# Patient Record
Sex: MALE | Race: BLACK OR AFRICAN AMERICAN | NOT HISPANIC OR LATINO | Employment: FULL TIME | URBAN - METROPOLITAN AREA
[De-identification: names, ages, dates, MRNs, and addresses within clinical notes are randomized per-mention and may not be internally consistent; named-entity substitution may affect disease eponyms.]

---

## 2018-09-11 ENCOUNTER — HOSPITAL ENCOUNTER (INPATIENT)
Facility: HOSPITAL | Age: 48
LOS: 1 days | Discharge: HOME | DRG: 247 | End: 2018-09-12
Attending: EMERGENCY MEDICINE | Admitting: INTERNAL MEDICINE

## 2018-09-11 DIAGNOSIS — I21.4 NSTEMI (NON-ST ELEVATED MYOCARDIAL INFARCTION): Primary | ICD-10-CM

## 2018-09-11 DIAGNOSIS — R07.9 CHEST PAIN: ICD-10-CM

## 2018-09-11 DIAGNOSIS — Z72.0 TOBACCO ABUSE: ICD-10-CM

## 2018-09-11 DIAGNOSIS — R79.89 ELEVATED TROPONIN: ICD-10-CM

## 2018-09-11 DIAGNOSIS — I35.1 AORTIC VALVE INSUFFICIENCY, ETIOLOGY OF CARDIAC VALVE DISEASE UNSPECIFIED: ICD-10-CM

## 2018-09-11 DIAGNOSIS — I10 ESSENTIAL HYPERTENSION: ICD-10-CM

## 2018-09-11 DIAGNOSIS — R07.9 CHEST PAIN, UNSPECIFIED TYPE: ICD-10-CM

## 2018-09-11 LAB
ALBUMIN SERPL BCP-MCNC: 3.9 G/DL
ALP SERPL-CCNC: 98 U/L
ALT SERPL W/O P-5'-P-CCNC: 28 U/L
ANION GAP SERPL CALC-SCNC: 11 MMOL/L
AORTIC VALVE REGURGITATION: ABNORMAL
APTT BLDCRRT: 23 SEC
AST SERPL-CCNC: 20 U/L
BASOPHILS # BLD AUTO: 0.01 K/UL
BASOPHILS NFR BLD: 0.1 %
BASOPHILS NFR BLD: 0.1 %
BASOPHILS NFR BLD: 0.2 %
BILIRUB SERPL-MCNC: 0.3 MG/DL
BNP SERPL-MCNC: 36 PG/ML
BUN SERPL-MCNC: 11 MG/DL
CALCIUM SERPL-MCNC: 9.7 MG/DL
CHLORIDE SERPL-SCNC: 103 MMOL/L
CHOLEST SERPL-MCNC: 194 MG/DL
CHOLEST/HDLC SERPL: 2.6 {RATIO}
CO2 SERPL-SCNC: 24 MMOL/L
CREAT SERPL-MCNC: 0.9 MG/DL
DIASTOLIC DYSFUNCTION: YES
DIFFERENTIAL METHOD: ABNORMAL
DIFFERENTIAL METHOD: NORMAL
DIFFERENTIAL METHOD: NORMAL
EOSINOPHIL # BLD AUTO: 0.1 K/UL
EOSINOPHIL # BLD AUTO: 0.2 K/UL
EOSINOPHIL # BLD AUTO: 0.2 K/UL
EOSINOPHIL NFR BLD: 1.6 %
EOSINOPHIL NFR BLD: 2.8 %
EOSINOPHIL NFR BLD: 3.2 %
ERYTHROCYTE [DISTWIDTH] IN BLOOD BY AUTOMATED COUNT: 13.9 %
ERYTHROCYTE [DISTWIDTH] IN BLOOD BY AUTOMATED COUNT: 14 %
ERYTHROCYTE [DISTWIDTH] IN BLOOD BY AUTOMATED COUNT: 14.1 %
EST. GFR  (AFRICAN AMERICAN): >60 ML/MIN/1.73 M^2
EST. GFR  (NON AFRICAN AMERICAN): >60 ML/MIN/1.73 M^2
ESTIMATED AVG GLUCOSE: 103 MG/DL
GLUCOSE SERPL-MCNC: 98 MG/DL
HBA1C MFR BLD HPLC: 5.2 %
HCT VFR BLD AUTO: 41.1 %
HCT VFR BLD AUTO: 43 %
HCT VFR BLD AUTO: 43.4 %
HDLC SERPL-MCNC: 74 MG/DL
HDLC SERPL: 38.1 %
HGB BLD-MCNC: 14.1 G/DL
HGB BLD-MCNC: 14.7 G/DL
HGB BLD-MCNC: 14.8 G/DL
INR PPP: 0.9
LDLC SERPL CALC-MCNC: 107.4 MG/DL
LYMPHOCYTES # BLD AUTO: 2.6 K/UL
LYMPHOCYTES # BLD AUTO: 3.2 K/UL
LYMPHOCYTES # BLD AUTO: 3.3 K/UL
LYMPHOCYTES NFR BLD: 33.9 %
LYMPHOCYTES NFR BLD: 40.8 %
LYMPHOCYTES NFR BLD: 48.9 %
MCH RBC QN AUTO: 28.6 PG
MCH RBC QN AUTO: 29 PG
MCH RBC QN AUTO: 29.1 PG
MCHC RBC AUTO-ENTMCNC: 33.9 G/DL
MCHC RBC AUTO-ENTMCNC: 34.3 G/DL
MCHC RBC AUTO-ENTMCNC: 34.4 G/DL
MCV RBC AUTO: 84 FL
MCV RBC AUTO: 85 FL
MCV RBC AUTO: 85 FL
MONOCYTES # BLD AUTO: 0.6 K/UL
MONOCYTES # BLD AUTO: 0.7 K/UL
MONOCYTES # BLD AUTO: 0.8 K/UL
MONOCYTES NFR BLD: 9 %
MONOCYTES NFR BLD: 9.4 %
MONOCYTES NFR BLD: 9.5 %
NEUTROPHILS # BLD AUTO: 2.5 K/UL
NEUTROPHILS # BLD AUTO: 3.8 K/UL
NEUTROPHILS # BLD AUTO: 4.2 K/UL
NEUTROPHILS NFR BLD: 38.5 %
NEUTROPHILS NFR BLD: 46.7 %
NEUTROPHILS NFR BLD: 54.8 %
NONHDLC SERPL-MCNC: 120 MG/DL
PLATELET # BLD AUTO: 251 K/UL
PLATELET # BLD AUTO: 256 K/UL
PLATELET # BLD AUTO: 259 K/UL
PMV BLD AUTO: 9.6 FL
PMV BLD AUTO: 9.7 FL
PMV BLD AUTO: 9.9 FL
POTASSIUM SERPL-SCNC: 3.7 MMOL/L
PROT SERPL-MCNC: 6.6 G/DL
PROTHROMBIN TIME: 9.9 SEC
RBC # BLD AUTO: 4.86 M/UL
RBC # BLD AUTO: 5.09 M/UL
RBC # BLD AUTO: 5.14 M/UL
RETIRED EF AND QEF - SEE NOTES: 65 (ref 55–65)
SODIUM SERPL-SCNC: 138 MMOL/L
TRIGL SERPL-MCNC: 63 MG/DL
TROPONIN I SERPL DL<=0.01 NG/ML-MCNC: 0.05 NG/ML
TROPONIN I SERPL DL<=0.01 NG/ML-MCNC: 0.85 NG/ML
WBC # BLD AUTO: 6.56 K/UL
WBC # BLD AUTO: 7.72 K/UL
WBC # BLD AUTO: 8.12 K/UL

## 2018-09-11 PROCEDURE — 93010 ELECTROCARDIOGRAM REPORT: CPT | Mod: ,,, | Performed by: INTERNAL MEDICINE

## 2018-09-11 PROCEDURE — 83036 HEMOGLOBIN GLYCOSYLATED A1C: CPT

## 2018-09-11 PROCEDURE — 25000003 PHARM REV CODE 250: Performed by: STUDENT IN AN ORGANIZED HEALTH CARE EDUCATION/TRAINING PROGRAM

## 2018-09-11 PROCEDURE — C9600 PERC DRUG-EL COR STENT SING: HCPCS | Mod: RI

## 2018-09-11 PROCEDURE — 93005 ELECTROCARDIOGRAM TRACING: CPT

## 2018-09-11 PROCEDURE — 21400001 HC TELEMETRY ROOM

## 2018-09-11 PROCEDURE — 83880 ASSAY OF NATRIURETIC PEPTIDE: CPT

## 2018-09-11 PROCEDURE — 99285 EMERGENCY DEPT VISIT HI MDM: CPT

## 2018-09-11 PROCEDURE — B211YZZ FLUOROSCOPY OF MULTIPLE CORONARY ARTERIES USING OTHER CONTRAST: ICD-10-PCS | Performed by: INTERNAL MEDICINE

## 2018-09-11 PROCEDURE — 63600175 PHARM REV CODE 636 W HCPCS: Performed by: INTERNAL MEDICINE

## 2018-09-11 PROCEDURE — 25500020 PHARM REV CODE 255

## 2018-09-11 PROCEDURE — 85025 COMPLETE CBC W/AUTO DIFF WBC: CPT

## 2018-09-11 PROCEDURE — 85610 PROTHROMBIN TIME: CPT

## 2018-09-11 PROCEDURE — 0270346 DILATION OF CORONARY ARTERY, ONE ARTERY, BIFURCATION, WITH DRUG-ELUTING INTRALUMINAL DEVICE, PERCUTANEOUS APPROACH: ICD-10-PCS | Performed by: INTERNAL MEDICINE

## 2018-09-11 PROCEDURE — 93306 TTE W/DOPPLER COMPLETE: CPT

## 2018-09-11 PROCEDURE — 84484 ASSAY OF TROPONIN QUANT: CPT | Mod: 91

## 2018-09-11 PROCEDURE — 4A033BC MEASUREMENT OF ARTERIAL PRESSURE, CORONARY, PERCUTANEOUS APPROACH: ICD-10-PCS | Performed by: INTERNAL MEDICINE

## 2018-09-11 PROCEDURE — A4216 STERILE WATER/SALINE, 10 ML: HCPCS | Performed by: STUDENT IN AN ORGANIZED HEALTH CARE EDUCATION/TRAINING PROGRAM

## 2018-09-11 PROCEDURE — 80053 COMPREHEN METABOLIC PANEL: CPT

## 2018-09-11 PROCEDURE — 85730 THROMBOPLASTIN TIME PARTIAL: CPT

## 2018-09-11 PROCEDURE — 25000003 PHARM REV CODE 250: Performed by: INTERNAL MEDICINE

## 2018-09-11 PROCEDURE — 84484 ASSAY OF TROPONIN QUANT: CPT

## 2018-09-11 PROCEDURE — 80061 LIPID PANEL: CPT

## 2018-09-11 PROCEDURE — 63600175 PHARM REV CODE 636 W HCPCS: Performed by: STUDENT IN AN ORGANIZED HEALTH CARE EDUCATION/TRAINING PROGRAM

## 2018-09-11 PROCEDURE — 93010 ELECTROCARDIOGRAM REPORT: CPT | Mod: 76,59,, | Performed by: INTERNAL MEDICINE

## 2018-09-11 PROCEDURE — 36415 COLL VENOUS BLD VENIPUNCTURE: CPT

## 2018-09-11 PROCEDURE — 96374 THER/PROPH/DIAG INJ IV PUSH: CPT

## 2018-09-11 PROCEDURE — 63600175 PHARM REV CODE 636 W HCPCS

## 2018-09-11 PROCEDURE — 93010 ELECTROCARDIOGRAM REPORT: CPT | Mod: 59,,, | Performed by: INTERNAL MEDICINE

## 2018-09-11 PROCEDURE — 25000003 PHARM REV CODE 250

## 2018-09-11 PROCEDURE — 93571 IV DOP VEL&/PRESS C FLO 1ST: CPT | Mod: 74

## 2018-09-11 PROCEDURE — 25000003 PHARM REV CODE 250: Performed by: EMERGENCY MEDICINE

## 2018-09-11 RX ORDER — LISINOPRIL 20 MG/1
20 TABLET ORAL DAILY
Status: DISCONTINUED | OUTPATIENT
Start: 2018-09-11 | End: 2018-09-12

## 2018-09-11 RX ORDER — ATORVASTATIN CALCIUM 40 MG/1
80 TABLET, FILM COATED ORAL NIGHTLY
Status: DISCONTINUED | OUTPATIENT
Start: 2018-09-11 | End: 2018-09-12 | Stop reason: HOSPADM

## 2018-09-11 RX ORDER — HEPARIN SODIUM,PORCINE/D5W 25000/250
12 INTRAVENOUS SOLUTION INTRAVENOUS CONTINUOUS
Status: DISCONTINUED | OUTPATIENT
Start: 2018-09-11 | End: 2018-09-11

## 2018-09-11 RX ORDER — SODIUM CHLORIDE 9 MG/ML
INJECTION, SOLUTION INTRAVENOUS CONTINUOUS
Status: ACTIVE | OUTPATIENT
Start: 2018-09-11 | End: 2018-09-12

## 2018-09-11 RX ORDER — ACETAMINOPHEN 325 MG/1
650 TABLET ORAL ONCE
Status: COMPLETED | OUTPATIENT
Start: 2018-09-11 | End: 2018-09-11

## 2018-09-11 RX ORDER — LISINOPRIL 10 MG/1
10 TABLET ORAL DAILY
Status: DISCONTINUED | OUTPATIENT
Start: 2018-09-11 | End: 2018-09-11

## 2018-09-11 RX ORDER — SODIUM CHLORIDE 0.9 % (FLUSH) 0.9 %
3 SYRINGE (ML) INJECTION EVERY 8 HOURS
Status: DISCONTINUED | OUTPATIENT
Start: 2018-09-11 | End: 2018-09-11

## 2018-09-11 RX ORDER — METOPROLOL TARTRATE 50 MG/1
50 TABLET ORAL
Status: COMPLETED | OUTPATIENT
Start: 2018-09-11 | End: 2018-09-11

## 2018-09-11 RX ORDER — ASPIRIN 81 MG/1
81 TABLET ORAL DAILY
Status: DISCONTINUED | OUTPATIENT
Start: 2018-09-11 | End: 2018-09-12 | Stop reason: HOSPADM

## 2018-09-11 RX ORDER — AMLODIPINE BESYLATE 5 MG/1
10 TABLET ORAL DAILY
Status: DISCONTINUED | OUTPATIENT
Start: 2018-09-11 | End: 2018-09-12 | Stop reason: HOSPADM

## 2018-09-11 RX ORDER — METOPROLOL TARTRATE 25 MG/1
25 TABLET, FILM COATED ORAL 2 TIMES DAILY
Status: DISCONTINUED | OUTPATIENT
Start: 2018-09-11 | End: 2018-09-12

## 2018-09-11 RX ORDER — ENOXAPARIN SODIUM 100 MG/ML
40 INJECTION SUBCUTANEOUS EVERY 24 HOURS
Status: DISCONTINUED | OUTPATIENT
Start: 2018-09-11 | End: 2018-09-12

## 2018-09-11 RX ADMIN — ASPIRIN 81 MG: 81 TABLET, COATED ORAL at 01:09

## 2018-09-11 RX ADMIN — Medication 3 ML: at 08:09

## 2018-09-11 RX ADMIN — ACETAMINOPHEN 650 MG: 325 TABLET ORAL at 08:09

## 2018-09-11 RX ADMIN — LISINOPRIL 20 MG: 10 TABLET ORAL at 08:09

## 2018-09-11 RX ADMIN — ENOXAPARIN SODIUM 40 MG: 100 INJECTION SUBCUTANEOUS at 06:09

## 2018-09-11 RX ADMIN — METOPROLOL TARTRATE 25 MG: 25 TABLET ORAL at 10:09

## 2018-09-11 RX ADMIN — SODIUM CHLORIDE: 0.9 INJECTION, SOLUTION INTRAVENOUS at 01:09

## 2018-09-11 RX ADMIN — AMLODIPINE BESYLATE 10 MG: 5 TABLET ORAL at 03:09

## 2018-09-11 RX ADMIN — TICAGRELOR 90 MG: 90 TABLET ORAL at 10:09

## 2018-09-11 RX ADMIN — NITROGLYCERIN 1 INCH: 20 OINTMENT TOPICAL at 01:09

## 2018-09-11 RX ADMIN — ATORVASTATIN CALCIUM 80 MG: 40 TABLET, FILM COATED ORAL at 10:09

## 2018-09-11 RX ADMIN — METOPROLOL TARTRATE 50 MG: 50 TABLET ORAL at 01:09

## 2018-09-11 RX ADMIN — BIVALIRUDIN 1.75 MG/KG/HR: 250 INJECTION, POWDER, LYOPHILIZED, FOR SOLUTION INTRAVENOUS at 01:09

## 2018-09-11 RX ADMIN — TICAGRELOR 180 MG: 90 TABLET ORAL at 01:09

## 2018-09-11 NOTE — BRIEF OP NOTE
Left heart cath via right radial: Dr.sub  Blood loss: < 50 cc  Complications: none  Left main: normal  LAD: normal  Cx: normal  Right: normal  Ramus: large with at least an 80% lesion in its mid portion; iFR .79. Thus, primary stent done without complications.    ASA, Brilinta, statin, blood pressure control

## 2018-09-11 NOTE — NURSING
Patient transferred to floor via stretcher with telemetry monitor in place and all personal belongings. Patient has no complaints of pain and NAD noted. Upon arrival to room patient placed on portable telemetry box and assisted to bed. PAT Shahid made aware of patient's arrival. Floor nurse and cath lab nurse assess site. Right Radial site clean dry and intact with gauze and Tegaderm in place. No swelling, bleeding or hematoma at site with +2 palpable radial pulses.  All questions answered. Family and nurse at bedside.

## 2018-09-11 NOTE — NURSING
RIGHT RADIAL Vasc band removed. Gauze with Tegaderm applied. Site clean, dry, and intact with bilateral +2 palpable radial pulses. No hematoma or bleeding noted. Patient has no complaints of pain at this time. Vital signs stable. Will continue to monitor.

## 2018-09-11 NOTE — NURSING
Patient arrived to room via stretcher with ER staff. Patient alert and oriented. Follows commands without issues. Tele monitor applied as per orders. ivf infusing via iv site to RAC via pump without difficulties. Verbalizes answers without hesitation. High fall risk protocol established. Bed alarm activated. For further information refer to admission assessment data sheets. Will continue to monitor patient and intervene prn.

## 2018-09-11 NOTE — CARE UPDATE
AM troponin further elevated to 0.852 from 0.046 without EKG changes > NSTEMI. Discontinued scheduled stress test, ordered heparin drip and placed cardiology consult. Holding Plavix for now until seen from cards and get their recs.    Aline Guzman MD  [facility] Internal Medicine HO-I

## 2018-09-11 NOTE — CONSULTS
[facility] Cardiology Consult Note       Date of Admit: 9/11/2018    Chief Complaint     Chest Pain for several hours     Subjective:      History of Present Illness:  Celso Dallas is a 48 y.o.  male with a PMH of HTN who presented on the evening of 9/10/11 with multiple hours of chest pain. The patient reports that he was relaxing at home yesterday evening when he began to experience sharp, substernal, non-radiating chest pain. He reports that he took an aspirin in attempt to relieve the pain but the pain did not improve. He also experienced associated diaphoresis and vomiting. He denies lightheadedness, headache, LOC, SOB. He reports that he has experienced similar but less severe chest pain last week, but that episode resolved on its own.  Patient called EMS and was brought to the ED at [facility]. Patient received ASA and sublingual nitro which led to improvement of his pain. Troponin has trended upward .046 --> .852. EKG has remained without signs of infarction.   Patient was recently started on HTN medication, but ran out a few weeks ago and thus has not been taking it. He denies any other medical history. Reports smoking ~2 packs of cigarettes since he was 17 and drinking a 6 pack of beer daily ( wife reports this is a low estimate and feels that he drinks up to 24 beers daily). He works as a   and reports he has not had any chest pain while at work. Cardiology is consulted for evaluation of ACS.     Past Medical History:  No past medical history on file.    Past Surgical History:  No past surgical history on file.    Allergies:  Review of patient's allergies indicates:  No Known Allergies    Home Medications:  Prior to Admission medications    Not on File       Family History:  No family history on file.    Social History:  Social History     Tobacco Use    Smoking status: Current Every Day Smoker     Packs/day: 1.00     Types: Cigarettes    Smokeless tobacco: Never Used   Substance Use Topics     "Alcohol use: Yes    Drug use: No       Review of Systems:  Pertinent items are noted in HPI. All other systems are reviewed and are negative.     Objective:   Last 24 Hour Vital Signs:  BP  Min: 130/94  Max: 173/97  Temp  Av.2 °F (36.8 °C)  Min: 98.1 °F (36.7 °C)  Max: 98.2 °F (36.8 °C)  Pulse  Av.2  Min: 68  Max: 109  Resp  Av  Min: 16  Max: 21  SpO2  Av.5 %  Min: 96 %  Max: 98 %  Height  Av' 4" (162.6 cm)  Min: 5' 4" (162.6 cm)  Max: 5' 4" (162.6 cm)  Weight  Av.7 kg (136 lb)  Min: 61.7 kg (135 lb 16 oz)  Max: 61.7 kg (136 lb)  Body mass index is 23.34 kg/m².  No intake/output data recorded.    Physical Examination:  /83   Pulse 80   Temp 98.1 °F (36.7 °C) (Oral)   Resp (!) 21   Ht 5' 4" (1.626 m)   Wt 61.7 kg (135 lb 16 oz)   SpO2 98%   BMI 23.34 kg/m²     General Appearance:    Alert, cooperative, no distress, appears stated age   Head:    Normocephalic, without obvious abnormality, atraumatic   Eyes:    PERRL, muddy sclera, EOMI, both eyes        Throat:   Lips, mucosa, and tongue normal; teeth and gums normal   Neck:   Supple, symmetrical, trachea midline, no adenopathy;        thyroid:  No enlargement/tenderness/nodules; no carotid    bruit or JVD   Lungs:     Clear to auscultation bilaterally, respirations unlabored   Heart:    Regular rate and rhythm, S1 and S2 normal, no murmur, rub   or gallop   Abdomen:     Soft, non-tender, bowel sounds active all four quadrants,     no masses, no organomegaly   Extremities:   Extremities normal, atraumatic, no cyanosis or edema   Pulses:   2+ and symmetric all extremities   Skin:   Skin color, texture, turgor normal, no rashes or lesions   Neurologic:   Grossly Normal          Laboratory:  Most Recent Data:  CBC:   Lab Results   Component Value Date    WBC 7.72 2018    HGB 14.1 2018    HCT 41.1 2018     2018    MCV 85 2018    RDW 14.1 2018       BMP:   Lab Results   Component Value " Date     09/11/2018    K 3.7 09/11/2018     09/11/2018    CO2 24 09/11/2018    BUN 11 09/11/2018    CREATININE 0.9 09/11/2018    GLU 98 09/11/2018    CALCIUM 9.7 09/11/2018     LFTs:   Lab Results   Component Value Date    PROT 6.6 09/11/2018    ALBUMIN 3.9 09/11/2018    BILITOT 0.3 09/11/2018    AST 20 09/11/2018    ALKPHOS 98 09/11/2018    ALT 28 09/11/2018     Coags:   Lab Results   Component Value Date    INR 0.9 09/11/2018     FLP:   Lab Results   Component Value Date    CHOL 194 09/11/2018    HDL 74 09/11/2018    LDLCALC 107.4 09/11/2018    TRIG 63 09/11/2018    CHOLHDL 38.1 09/11/2018     DM:   Lab Results   Component Value Date    HGBA1C 5.2 09/11/2018    LDLCALC 107.4 09/11/2018    CREATININE 0.9 09/11/2018     Thyroid: No results found for: TSH, FREET4, G3DBOZS, X8PAHTH, THYROIDAB  Anemia: No results found for: IRON, TIBC, FERRITIN, JMUHGDCJ04, FOLATE  Cardiac:   Lab Results   Component Value Date    TROPONINI 0.852 (H) 09/11/2018    BNP 36 09/11/2018     Urinalysis: No results found for: LABURIN, COLORU, CLARITYU, SPECGRAV, LABSPEC, NITRITE, PROTEINUR, GLUCOSEU, KETONESU, UROBILINOGEN, BILIRUBINUR, BLOODU, RBCU, WBCUA    Trended Lab Data:  Recent Labs   Lab  09/11/18   0100 09/11/18   0654   WBC  6.56  7.72   HGB  14.8  14.1   HCT  43.0  41.1   PLT  259  251   MCV  85  85   RDW  13.9  14.1   NA  138   --    K  3.7   --    CL  103   --    CO2  24   --    BUN  11   --    CREATININE  0.9   --    GLU  98   --    PROT  6.6   --    ALBUMIN  3.9   --    BILITOT  0.3   --    AST  20   --    ALKPHOS  98   --    ALT  28   --        Trended Cardiac Data:  Recent Labs   Lab  09/11/18   0100 09/11/18   0506   TROPONINI  0.046*  0.852*   BNP  36   --        Other Results:  EKG (my interpretation): Normal sinus rhythm, no evidence of infarction or ischemia      Radiology:  Imaging Results          X-Ray Chest AP Portable (Final result)  Result time 09/11/18 01:38:53    Final result by Santi Castro MD  "(09/11/18 01:38:53)                 Impression:      Coarsened interstitial lung markings, possibly mild interstitial edema.  Otherwise, no significant abnormality.      Electronically signed by: Santi Castro MD  Date:    09/11/2018  Time:    01:38             Narrative:    EXAMINATION:  XR CHEST AP PORTABLE    CLINICAL HISTORY:  Provided history is "  Chest pain, unspecified".    TECHNIQUE:  One view of the chest.    COMPARISON:  None.    FINDINGS:  Cardiac wires overlie the chest.  Cardiac silhouette is at the upper limits of normal.  There are coarsened interstitial lung markings, but no large focal consolidation.  No sizable pleural effusion.  No pneumothorax.                                 Assessment:     Celso Dallas is a 48 y.o. male with PMH of HTN who presented with chest pain.        Plan:     NSTEMI  - patient presented with persistent non radiating substernal chest pain   - troponin has trended upward .046 --> .852.   - EKG remains without signs of ischemia or infarction   - started on ASA, heparin drip, BB by primary team   - will take to cath lab for coronary angiography     Essential HTN  - recently diagnosed with HTN. Had not seen a doctor for some time prior   - has not been taking his medication for a few weeks as he ran out   - restart home lisinopril         Alfonso Alatorre MD  [facility] Cardiology Service HO-1            "

## 2018-09-11 NOTE — NURSING
2cc of air removed from RIGHT RADIAL Vasc band. No hematoma or bleeding noted.Vital signs stable with +2 palpable pulses. Will continue to monitor.

## 2018-09-11 NOTE — NURSING
Patient arrived to recovery cath lab.  Pt drowsy but able to follow commands.  VSS.  R radial site is CDI, site soft, no bleeding or hematoma noted.  Fall risk precautions given and patients acknowledges.  Will continue to monitor

## 2018-09-11 NOTE — LETTER
September 12, 2018         [address] [place] [address] [place] [address]  Phone: [phone]  Fax: [phone]       Patient: Celso Dallas   YOB: 1970  Date of Visit: 09/12/2018    To Whom It May Concern:    Cyndi Dallas  was at [facility] on 09/12/2018. His family member:      was at the hospital visiting him in his time of need. Please excuse from work today 9/12. If you have any questions or concerns, or if I can be of further assistance, please do not hesitate to contact me.    Sincerely,    Brian Figueroa, DO

## 2018-09-11 NOTE — H&P
[facility] Medicine H&P Note     Admitting Team: [facility] Hospitalist Team A  Attending Physician: Dr. Maryan Castro  Resident: Dr. Uziel Don  Intern: Dr. Brian Figueroa    Date of Admit: 9/11/2018    Chief Complaint     Intermittent chest pain x 1 week    Subjective:      History of Present Illness:  Celso Dallas is a 48 y.o. AA male who  has no past medical history on file.. The patient presented to [facility] on 9/11/2018 with a primary complaint of Chest Pain (Patient presents to the ED via  EMS from home with reports of having chest pain that started tonight. Treated with x 1 sublingual nitro per EMS. Pain started at a 6 and came down to a 3. )      The patient was in their usual state of health until about a week prior to presentation when he had an episode of chest pain while laying down, which he reports went away on its own. He had an additional milder episode later in the week which also went away on its own. Then on evening of 9/10, pt had onset of an episode of chest pain at rest described as sharp 8/10 pain in mid-chest, non-radiating. The pain continued and he had an episode of vomiting as well as diaphoresis, so he decided to call EMS. EMS reportedly gave him ASA and nitro which finally relieved his pain; BP after administration of the ASA and nitro was in 130s systolic. Pt denies SOB, syncope, headache, vision changes upon arrival to ED. Pt denies any recent F/C, N/V, abd pain, changes in BMs, or urinary sx.    Pt does report that he was recently diagnosed with HTN and had been started on a medication, possibly lisinopril, but that he has been out of it for about 3 weeks now.    Past Medical History:  HTN    Past Surgical History:  Appendectomy    Allergies:  No Known Allergies    Home Medications:  Prior to Admission medications    Not on File       Family History:  HTN, DM-mother    Social History:  Social History     Tobacco Use    Smoking status: Current Every Day Smoker      "Packs/day: 1.00ppd x 30 years     Types: Cigarettes    Smokeless tobacco: Never Used   Substance Use Topics    Alcohol use: Yes, 6pack/day, up to 12 pack on wkends    Drug use: No   Lives with girlfriend since moving to [place] recently for work from [place], [place]. Works as a .    Review of Systems:  Pertinent items are noted in HPI. All other systems are reviewed and are negative.    Health Maintaince :   Primary Care Physician: no PCP (pt just moved here from [place], [place])    Immunizations:   TDap UTD  Flu not UTD  Pna not UTD    Cancer Screening:  Colonoscopy: not yet had one     Objective:   Last 24 Hour Vital Signs:  BP  Min: 159/99  Max: 173/97  Pulse  Av.5  Min: 83  Max: 86  Resp  Av  Min: 16  Max: 16  SpO2  Av %  Min: 98 %  Max: 98 %  Height  Av' 4" (162.6 cm)  Min: 5' 4" (162.6 cm)  Max: 5' 4" (162.6 cm)  Weight  Av.7 kg (136 lb)  Min: 61.7 kg (136 lb)  Max: 61.7 kg (136 lb)  Body mass index is 23.34 kg/m².  No intake/output data recorded.    Physical Examination:  GEN-AOx3, NAD, laying comfortably in bed  HEENT-PERRL, EOMI, MMM, throat without erythema or exudates  NECK-no thyromegaly or other masses; acanthosis nigricans present  HEART-RRR, no murmurs or rubs  RESP- normal work of breathing, mild crackles/rhonchi in posterior 2/3 of lungs, upper and anterior lung fields clear  ABD-soft, NT, ND, +BS; no masses or HSM  EXT-no clubbing, cyanosis, or edema; 2+ DP/PT pulses  NEURO-CN II-XII grossly intact; moves all four extremities equally; light touch sensation intact and symmetric in all four extremities  SKIN-warm and dry; no rashes       Laboratory:  Most Recent Data:  CBC:   Lab Results   Component Value Date    WBC 6.56 2018    HGB 14.8 2018    HCT 43.0 2018     2018    MCV 85 2018    RDW 13.9 2018     WBC Differential: 38.5 % N, 0 % Bands, 48.9 % L, 9 % M, 3.2 % Eo, 0.2 % Baso, 0 additional cells seen  BMP:   Lab " Results   Component Value Date     09/11/2018    K 3.7 09/11/2018     09/11/2018    CO2 24 09/11/2018    BUN 11 09/11/2018    CREATININE 0.9 09/11/2018    GLU 98 09/11/2018    CALCIUM 9.7 09/11/2018     LFTs:   Lab Results   Component Value Date    PROT 6.6 09/11/2018    ALBUMIN 3.9 09/11/2018    BILITOT 0.3 09/11/2018    AST 20 09/11/2018    ALKPHOS 98 09/11/2018    ALT 28 09/11/2018     Coags: No results found for: INR, PROTIME, PTT  FLP: No results found for: CHOL, HDL, LDLCALC, TRIG, CHOLHDL  DM:   Lab Results   Component Value Date    CREATININE 0.9 09/11/2018     Thyroid: No results found for: TSH, FREET4, Z0YXIOK, S5XGQQW, THYROIDAB  Anemia: No results found for: IRON, TIBC, FERRITIN, [id number], FOLATE  Cardiac:   Lab Results   Component Value Date    TROPONINI 0.046 (H) 09/11/2018    BNP 36 09/11/2018     Urinalysis: No results found for: LABURIN, COLORU, CLARITYU, SPECGRAV, LABSPEC, NITRITE, PROTEINUR, GLUCOSEU, KETONESU, UROBILINOGEN, BILIRUBINUR, BLOODU, RBCU, WBCUA    Trended Lab Data:  Recent Labs   Lab  09/11/18   0100   WBC  6.56   HGB  14.8   HCT  43.0   PLT  259   MCV  85   RDW  13.9   NA  138   K  3.7   CL  103   CO2  24   BUN  11   CREATININE  0.9   GLU  98   PROT  6.6   ALBUMIN  3.9   BILITOT  0.3   AST  20   ALKPHOS  98   ALT  28       Trended Cardiac Data:  Recent Labs   Lab  09/11/18   0100   TROPONINI  0.046*   BNP  36       Microbiology Data:  None    Other Results:  EKG : 9/11 LVH, left axis deviation; no ST changes    Radiology:  Imaging Results          X-Ray Chest AP Portable (Final result)  Result time 09/11/18 01:38:53    Final result by Santi Castro MD (09/11/18 01:38:53)                 Impression:      Coarsened interstitial lung markings, possibly mild interstitial edema.  Otherwise, no significant abnormality.      Electronically signed by: Santi Castro MD  Date:    09/11/2018  Time:    01:38             Narrative:    EXAMINATION:  XR CHEST AP  "PORTABLE    CLINICAL HISTORY:  Provided history is "  Chest pain, unspecified".    TECHNIQUE:  One view of the chest.    COMPARISON:  None.    FINDINGS:  Cardiac wires overlie the chest.  Cardiac silhouette is at the upper limits of normal.  There are coarsened interstitial lung markings, but no large focal consolidation.  No sizable pleural effusion.  No pneumothorax.                                 Assessment:     Celso Dallas is a 48 y.o. male with:  Patient Active Problem List    Diagnosis Date Noted    Chest pain 09/11/2018    Essential hypertension 09/11/2018        Plan:     Chest pain  -pt with possible atypical angina (intermittent chest pain at rest x3 in past week, usually when seated/laying down)  -pain on 9/11 relieved with ASA and nitroglycerin given by EMS  -EKG showed LVH, left axis deviation; no ST changes > trend  -Trop 0.046 > trend  -ordered ECHO and stress test     Essential HTN  -pt recently diagnosed with HTN and on lisinopril at home, unknown dose, but ran out 3 weeks ago  -will start with lisinopril 10mg while inpatient    HCM  -check A1c (pt with acanthosis nigricans on exam)  -check lipid panel  -give pneumovax prior to discharge      Code Status:     Full    Aline Guzman MD  [facility] Internal Medicine HO-I    [facility] Medicine Hospitalist Pager numbers:   [facility] Hospitalist Medicine Team A (Gloria/Haylie): [phone]  [facility] Hospitalist Medicine Team B (Jorge/Vaishali):  [phone]        "

## 2018-09-11 NOTE — ED PROVIDER NOTES
Encounter Date: 9/11/2018    SCRIBE #1 NOTE: I, Jaime Weathers, am scribing for, and in the presence of,  Dr. Matthews. I have scribed the entire note.       History     Chief Complaint   Patient presents with    Chest Pain     Patient presents to the ED via  EMS from home with reports of having chest pain that started tonight. Treated with x 1 sublingual nitro per EMS. Pain started at a 6 and came down to a 3.      This is a 48 y.o. male who has no past medical history on file.     The patient presents to the Emergency Department with chest pain.   He reports onset of symptoms was about 1 week ago  The patient reports the pain has been intermittent since onset.  Located in the center of the chest and described as pressure.  The patient states he was resting when the pain began  Symptoms are associated with palpitations, nausea and vomiting.  Pt denies shortness of breath.   There was no change in symptoms with movement or rest.   The patient admits to taking an aspirin.  Per EMS the patient was given sublingual Nitro and 3-81 mg aspirin with improvement of pain  Patient has no prior history of similar symptoms.   Of note the patient was recently diagnosed with hypertension a few weeks ago and non-compliant.  Pt has no past surgical history on file.        The history is provided by the patient and the EMS personnel.     Review of patient's allergies indicates:  No Known Allergies  No past medical history on file.  No past surgical history on file.  No family history on file.  Social History     Tobacco Use    Smoking status: Current Every Day Smoker     Packs/day: 1.00     Types: Cigarettes    Smokeless tobacco: Never Used   Substance Use Topics    Alcohol use: Yes    Drug use: No     Review of Systems   Constitutional: Positive for diaphoresis. Negative for chills and fever.   Eyes: Negative for visual disturbance.   Respiratory: Negative for shortness of breath.    Cardiovascular: Positive for chest pain  and palpitations.   Gastrointestinal: Positive for nausea and vomiting.   Musculoskeletal: Negative for back pain.   Neurological: Positive for dizziness and weakness. Negative for headaches.   All other systems reviewed and are negative.      Physical Exam     Initial Vitals   BP Pulse Resp Temp SpO2   09/11/18 0050 09/11/18 0050 09/11/18 0050 09/11/18 0312 09/11/18 0050   (!) 173/97 83 16 98.2 °F (36.8 °C) 98 %      MAP       --                Physical Exam    Nursing note and vitals reviewed.  Constitutional: He appears well-developed and well-nourished. He is not diaphoretic. No distress.   HENT:   Head: Normocephalic and atraumatic.   Mouth/Throat: Oropharynx is clear and moist.   Eyes: Conjunctivae and EOM are normal.   Neck: Normal range of motion. Neck supple.   Cardiovascular: Normal rate, regular rhythm and normal heart sounds. Exam reveals no gallop and no friction rub.    No murmur heard.  Pulmonary/Chest: Breath sounds normal. He has no wheezes. He has no rhonchi. He has no rales.   Abdominal: Soft. There is no tenderness. There is no rebound and no guarding.   Musculoskeletal: Normal range of motion. He exhibits no edema or tenderness.   Lymphadenopathy:     He has no cervical adenopathy.   Neurological: He is alert and oriented to person, place, and time. He has normal strength.   Skin: Skin is warm and dry. No rash noted.         ED Course   Procedures  Labs Reviewed   CBC W/ AUTO DIFFERENTIAL - Abnormal; Notable for the following components:       Result Value    Lymph% 48.9 (*)     All other components within normal limits   TROPONIN I - Abnormal; Notable for the following components:    Troponin I 0.046 (*)     All other components within normal limits   COMPREHENSIVE METABOLIC PANEL   B-TYPE NATRIURETIC PEPTIDE   LIPID PANEL   HEMOGLOBIN A1C   TROPONIN I     EKG Readings: (Independently Interpreted)   Initial Reading: No STEMI. Conduction: Normal.   Normal sinus rhythm with a rate of 74. LAD.  "LVH. No ST or T wave changes.       Imaging Results          X-Ray Chest AP Portable (Final result)  Result time 09/11/18 01:38:53    Final result by Santi Castro MD (09/11/18 01:38:53)                 Impression:      Coarsened interstitial lung markings, possibly mild interstitial edema.  Otherwise, no significant abnormality.      Electronically signed by: Santi Castro MD  Date:    09/11/2018  Time:    01:38             Narrative:    EXAMINATION:  XR CHEST AP PORTABLE    CLINICAL HISTORY:  Provided history is "  Chest pain, unspecified".    TECHNIQUE:  One view of the chest.    COMPARISON:  None.    FINDINGS:  Cardiac wires overlie the chest.  Cardiac silhouette is at the upper limits of normal.  There are coarsened interstitial lung markings, but no large focal consolidation.  No sizable pleural effusion.  No pneumothorax.                                 Medical Decision Making:   Initial Assessment:   This is an emergent evaluation of a 48 y.o.male patient with presentation of chest pain with associated symptoms.   Initial differentials include but are not limited to: ACS, GERD, Aortic dissection, thoracic aneurysm.   Plan: cardiac work up including troponin, EKG and chest xray, Beta blocker, Nitro paste, likely admit    Clinical Tests:   Lab Tests: Ordered and Reviewed  Radiological Study: Ordered and Reviewed  Medical Tests: Ordered and Reviewed  ED Management:  1:45 AM Patient has an elevated troponin of 0.046, will admit the patient to the hospital for further evaluation    1:47 AM [facility]    1:47 AM Case discussed with [facility], will come to evaluate and admit the patient                      Clinical Impression:     1. Chest pain         Scribe attestation: I, Dr. Venancio Matthews, personally performed the services described in this documentation.   All medical record entries made by the scribe were at my direction and in my presence.   I have reviewed the chart and " agree that the record is accurate and complete.   Venancio Matthews MD.                   Venancio Matthews MD  09/11/18 0547

## 2018-09-12 ENCOUNTER — CLINICAL SUPPORT (OUTPATIENT)
Dept: SMOKING CESSATION | Facility: CLINIC | Age: 48
End: 2018-09-12
Payer: COMMERCIAL

## 2018-09-12 ENCOUNTER — DOCUMENTATION ONLY (OUTPATIENT)
Dept: PHARMACY | Facility: CLINIC | Age: 48
End: 2018-09-12

## 2018-09-12 VITALS
BODY MASS INDEX: 24.81 KG/M2 | TEMPERATURE: 99 F | SYSTOLIC BLOOD PRESSURE: 141 MMHG | HEIGHT: 64 IN | HEART RATE: 65 BPM | WEIGHT: 145.31 LBS | RESPIRATION RATE: 18 BRPM | DIASTOLIC BLOOD PRESSURE: 98 MMHG | OXYGEN SATURATION: 100 %

## 2018-09-12 DIAGNOSIS — Z72.0 TOBACCO ABUSE: Primary | ICD-10-CM

## 2018-09-12 DIAGNOSIS — F17.210 CIGARETTE SMOKER: ICD-10-CM

## 2018-09-12 DIAGNOSIS — Z72.0 TOBACCO USE: ICD-10-CM

## 2018-09-12 PROBLEM — I25.10 CORONARY ARTERY DISEASE INVOLVING NATIVE CORONARY ARTERY: Status: ACTIVE | Noted: 2018-09-12

## 2018-09-12 PROBLEM — I35.1 AORTIC INSUFFICIENCY: Status: ACTIVE | Noted: 2018-09-12

## 2018-09-12 PROBLEM — I21.4 NSTEMI (NON-ST ELEVATED MYOCARDIAL INFARCTION): Status: RESOLVED | Noted: 2018-09-11 | Resolved: 2018-09-12

## 2018-09-12 LAB
ANION GAP SERPL CALC-SCNC: 9 MMOL/L
BUN SERPL-MCNC: 8 MG/DL
CALCIUM SERPL-MCNC: 9.9 MG/DL
CHLORIDE SERPL-SCNC: 106 MMOL/L
CO2 SERPL-SCNC: 21 MMOL/L
CORONARY STENOSIS: ABNORMAL
CORONARY STENT: YES
CREAT SERPL-MCNC: 0.8 MG/DL
EST. GFR  (AFRICAN AMERICAN): >60 ML/MIN/1.73 M^2
EST. GFR  (NON AFRICAN AMERICAN): >60 ML/MIN/1.73 M^2
GLUCOSE SERPL-MCNC: 95 MG/DL
POTASSIUM SERPL-SCNC: 4.1 MMOL/L
SODIUM SERPL-SCNC: 136 MMOL/L

## 2018-09-12 PROCEDURE — S4991 NICOTINE PATCH NONLEGEND: HCPCS | Performed by: STUDENT IN AN ORGANIZED HEALTH CARE EDUCATION/TRAINING PROGRAM

## 2018-09-12 PROCEDURE — 36415 COLL VENOUS BLD VENIPUNCTURE: CPT

## 2018-09-12 PROCEDURE — 25000003 PHARM REV CODE 250: Performed by: INTERNAL MEDICINE

## 2018-09-12 PROCEDURE — 99407 BEHAV CHNG SMOKING > 10 MIN: CPT | Mod: S$GLB,,,

## 2018-09-12 PROCEDURE — 3E0234Z INTRODUCTION OF SERUM, TOXOID AND VACCINE INTO MUSCLE, PERCUTANEOUS APPROACH: ICD-10-PCS | Performed by: INTERNAL MEDICINE

## 2018-09-12 PROCEDURE — 25000003 PHARM REV CODE 250: Performed by: STUDENT IN AN ORGANIZED HEALTH CARE EDUCATION/TRAINING PROGRAM

## 2018-09-12 PROCEDURE — 80048 BASIC METABOLIC PNL TOTAL CA: CPT

## 2018-09-12 PROCEDURE — 93010 ELECTROCARDIOGRAM REPORT: CPT | Mod: ,,, | Performed by: INTERNAL MEDICINE

## 2018-09-12 PROCEDURE — 93005 ELECTROCARDIOGRAM TRACING: CPT

## 2018-09-12 RX ORDER — LISINOPRIL 20 MG/1
20 TABLET ORAL ONCE
Status: COMPLETED | OUTPATIENT
Start: 2018-09-12 | End: 2018-09-12

## 2018-09-12 RX ORDER — METOPROLOL SUCCINATE 25 MG/1
25 TABLET, EXTENDED RELEASE ORAL DAILY
Status: DISCONTINUED | OUTPATIENT
Start: 2018-09-12 | End: 2018-09-12

## 2018-09-12 RX ORDER — ASPIRIN 81 MG/1
81 TABLET ORAL DAILY
Refills: 0 | COMMUNITY
Start: 2018-09-13 | End: 2022-05-31 | Stop reason: SDUPTHER

## 2018-09-12 RX ORDER — ATORVASTATIN CALCIUM 40 MG/1
40 TABLET, FILM COATED ORAL NIGHTLY
Qty: 90 TABLET | Refills: 3 | OUTPATIENT
Start: 2018-09-12 | End: 2019-09-12

## 2018-09-12 RX ORDER — AMLODIPINE BESYLATE 10 MG/1
10 TABLET ORAL DAILY
Qty: 30 TABLET | Refills: 1 | Status: SHIPPED | OUTPATIENT
Start: 2018-09-13 | End: 2022-05-31 | Stop reason: SDUPTHER

## 2018-09-12 RX ORDER — CARVEDILOL 12.5 MG/1
12.5 TABLET ORAL 2 TIMES DAILY
Status: DISCONTINUED | OUTPATIENT
Start: 2018-09-12 | End: 2018-09-12 | Stop reason: HOSPADM

## 2018-09-12 RX ORDER — IBUPROFEN 200 MG
1 TABLET ORAL DAILY
Status: DISCONTINUED | OUTPATIENT
Start: 2018-09-12 | End: 2018-09-12 | Stop reason: HOSPADM

## 2018-09-12 RX ORDER — LISINOPRIL 40 MG/1
40 TABLET ORAL DAILY
Qty: 90 TABLET | Refills: 3 | OUTPATIENT
Start: 2018-09-12 | End: 2019-09-12

## 2018-09-12 RX ORDER — METOPROLOL SUCCINATE 25 MG/1
25 TABLET, EXTENDED RELEASE ORAL DAILY
Qty: 90 TABLET | Refills: 3 | Status: CANCELLED | OUTPATIENT
Start: 2018-09-12 | End: 2019-09-12

## 2018-09-12 RX ORDER — CARVEDILOL 12.5 MG/1
12.5 TABLET ORAL 2 TIMES DAILY
Qty: 60 TABLET | Refills: 1 | Status: SHIPPED | OUTPATIENT
Start: 2018-09-12 | End: 2022-05-31

## 2018-09-12 RX ORDER — CARVEDILOL 12.5 MG/1
12.5 TABLET ORAL 2 TIMES DAILY
Status: DISCONTINUED | OUTPATIENT
Start: 2018-09-12 | End: 2018-09-12

## 2018-09-12 RX ORDER — CARVEDILOL 12.5 MG/1
12.5 TABLET ORAL 2 TIMES DAILY
Qty: 60 TABLET | Refills: 11 | OUTPATIENT
Start: 2018-09-12 | End: 2019-09-12

## 2018-09-12 RX ORDER — LISINOPRIL 20 MG/1
40 TABLET ORAL DAILY
Status: DISCONTINUED | OUTPATIENT
Start: 2018-09-12 | End: 2018-09-12 | Stop reason: HOSPADM

## 2018-09-12 RX ORDER — LISINOPRIL 40 MG/1
40 TABLET ORAL DAILY
Qty: 30 TABLET | Refills: 1 | Status: SHIPPED | OUTPATIENT
Start: 2018-09-13 | End: 2022-05-31

## 2018-09-12 RX ORDER — ATORVASTATIN CALCIUM 80 MG/1
80 TABLET, FILM COATED ORAL NIGHTLY
Qty: 30 TABLET | Refills: 1 | Status: SHIPPED | OUTPATIENT
Start: 2018-09-12 | End: 2022-05-31 | Stop reason: DRUGHIGH

## 2018-09-12 RX ORDER — ASPIRIN 81 MG/1
81 TABLET ORAL DAILY
Qty: 90 TABLET | Refills: 3 | OUTPATIENT
Start: 2018-09-12 | End: 2019-09-12

## 2018-09-12 RX ORDER — AMLODIPINE BESYLATE 10 MG/1
10 TABLET ORAL DAILY
Qty: 90 TABLET | Refills: 3 | OUTPATIENT
Start: 2018-09-12 | End: 2019-09-12

## 2018-09-12 RX ADMIN — LISINOPRIL 40 MG: 20 TABLET ORAL at 08:09

## 2018-09-12 RX ADMIN — ASPIRIN 81 MG: 81 TABLET, COATED ORAL at 08:09

## 2018-09-12 RX ADMIN — AMLODIPINE BESYLATE 10 MG: 5 TABLET ORAL at 08:09

## 2018-09-12 RX ADMIN — SODIUM CHLORIDE: 0.9 INJECTION, SOLUTION INTRAVENOUS at 12:09

## 2018-09-12 RX ADMIN — LISINOPRIL 20 MG: 20 TABLET ORAL at 01:09

## 2018-09-12 RX ADMIN — TICAGRELOR 90 MG: 90 TABLET ORAL at 08:09

## 2018-09-12 RX ADMIN — CARVEDILOL 12.5 MG: 12.5 TABLET, FILM COATED ORAL at 08:09

## 2018-09-12 RX ADMIN — NICOTINE 1 PATCH: 21 PATCH, EXTENDED RELEASE TRANSDERMAL at 12:09

## 2018-09-12 NOTE — PROGRESS NOTES
"[facility] Progress Note    Subjective:      Patient reports no chest pain or SOB overnight. He had no complaints and tolerated a diet. He tolerated a heart cath yesterday with no complications. Cath showed 80% lesion in ramus and a stent was placed.      Objective:   Last 24 Hour Vital Signs:  BP  Min: 131/85  Max: 187/115  Temp  Av.2 °F (36.8 °C)  Min: 98.1 °F (36.7 °C)  Max: 98.3 °F (36.8 °C)  Pulse  Av.2  Min: 50  Max: 84  Resp  Av.6  Min: 12  Max: 22  SpO2  Av.8 %  Min: 96 %  Max: 100 %  Height  Av' 4" (162.6 cm)  Min: 5' 4" (162.6 cm)  Max: 5' 4" (162.6 cm)  Weight  Av.6 kg (149 lb 0.5 oz)  Min: 67.6 kg (149 lb 0.5 oz)  Max: 67.6 kg (149 lb 0.5 oz)  I/O last 3 completed shifts:  In: 240 [P.O.:240]  Out: -     Physical Examination:  HEENT: Conjunctivae and EOM are normal. No scleral icterus.   Neck: supple. No masses. No thyromegaly. No bruits.  Lymph nodes: no lymphadenopathy  Cardio: RRR. S1, S2 normal without murmur/gallop/rub. No S3, S4. chest pain elicited  with palpation of left chest. Intact distal pulses.   Pulmonary: CTAB. No wheezes/rales/crackles.  Skin: No rash noted. Patient is not diaphoretic. No pallor.   Abdomen: soft, non-tender, non-distended. No masses. No rebound/guarding. No  hepatosplenomegaly. +BS  Extremities: no cyanosis, clubbing, or edema. No rash or lesions. + pedal pulses  MSK: No edema or tenderness.  Neuro: CN II-XII grossly intact. No decrease in strength. No decrease in sensation.  Psychiatry: alert and oriented X3. Responds appropriately to questions.    Laboratory:  Laboratory Data Reviewed: yes  Pertinent Findings:    Other Results:  EKG (my interpretation):        Current Medications:     Infusions:       Scheduled:   amLODIPine  10 mg Oral Daily    aspirin  81 mg Oral Daily    atorvastatin  80 mg Oral QHS    lisinopril  40 mg Oral Daily    metoprolol succinate  25 mg Oral Daily    ticagrelor  90 mg Oral BID        PRN:  pneumoc 13 " conj-dip cr(PF)      Assessment:     Celso Dallas is a 48 y.o.male with  Patient Active Problem List    Diagnosis Date Noted    Tobacco abuse 09/12/2018    Chest pain 09/11/2018    Essential hypertension 09/11/2018        Plan:     NSTEMI  Left heart cath yesterday  Large 80% lesion in the mid portion of ramus with stent placement   Echo showed concentric hypertrophy, EF 60-65%, bi-leaflet aortic valve and mild aortic regurgitation  ASA  Brilinta  Statin  No chest pain or SOB overnight  He will need follow up with Cardiology outpatient     HTN  Elevated bp overnight around 170s systolic   Continue amlodipine  Coreg started this morning   Continue ACE  Blood pressure elevated overnight but Coreg added this morning  Monitor blood pressure either today or with PCP for optimal control     Tobacco abuse  Recommend smoking cessation program    Alcohol abuse   Recommend follow up with PCP for cessation     We appreciate the consult. Please see attestation above    Keyur Beckman MD   [facility]

## 2018-09-12 NOTE — NURSING
/100... Advised \[facility] [place] [facility]\"" medicine, they okay w/ that till morning meds.

## 2018-09-12 NOTE — PROGRESS NOTES
Patient was seen by Tobacco Treatment Specialist.  Patient states that he smokes 1.5 packs per day, and is experiencing nicotine cravings.  Order obtained for 21 mg nicotine patch.  Smoking cessation education and resources provided.  Patient states that he is highly motivated to quit smoking, and he was enrolled in the Ambulatory Smoking Cessation program during our visit.

## 2018-09-12 NOTE — PLAN OF CARE
Problem: Patient Care Overview  Goal: Plan of Care Review   09/12/18 0142   Coping/Psychosocial   Plan Of Care Reviewed With patient   Pt reports no pain or discomfort any where. BP was 170/110.  Metoprolol was ordered 25 mg.  0000 bpBP again christi at 169/110 manual cuff. Called MD.  Lisinopril ordered.      Tele: NSR, 60 80 HR,  No alarms.     Bed in lowest position, wheels locked, non skid socks, ID band worn, personal items and call bell with in reach, bed alarm set.

## 2018-09-12 NOTE — PLAN OF CARE
TN went to meet with patient. Patient's fiance' and uncle at bedside. Patient is independent, and lives with his fijenn' at home. He does not have home health or medical equipment at home. He does not take any medications at home or have a PCP. Patient is agreeable to follow-up at the [facility]. TN left message to schedule. Patient still drives. He does not have insurance either. Medications will be sent for bedside delivery. TN will continue to follow.    Future Appointments   Date Time Provider Department Center   9/20/2018  1:00 PM Yesi Lewis MD [facility]        09/12/18 9377   Discharge Assessment   Assessment Type Discharge Planning Assessment   Confirmed/corrected address and phone number on facesheet? Yes   Assessment information obtained from? Patient   Prior to hospitilization cognitive status: Alert/Oriented   Prior to hospitalization functional status: Independent   Current cognitive status: Alert/Oriented   Current Functional Status: Independent   Facility Arrived From: Home   Lives With significant other   Able to Return to Prior Arrangements yes   Is patient able to care for self after discharge? Yes   Patient's perception of discharge disposition home or selfcare   Readmission Within The Last 30 Days no previous admission in last 30 days   Equipment Currently Used at Home none   Do you have any problems affording any of your prescribed medications? TBD   Does the patient have transportation home? Yes   Transportation Available family or friend will provide   Dialysis Name and Scheduled days N/A   Does the patient receive services at the [facility]madin Clinic? No   Discharge Plan A Home with family   Discharge Plan B Home with family   Patient/Family In Agreement With Plan yes     Cici Finch RN  Transition Navigator  [phone]

## 2018-09-12 NOTE — PLAN OF CARE
TN spoke with Dr. Hedrick's nurse Yvette. Patient does not have insurance. She informed TN even though patient had cath done, he needs to follow-up at [facility]. TN will ask MD to write referral.     TN notified MD team regarding above information.    Update: TN met with patient, family at bedside. Printed follow-up information reviewed with patient. He verbalized understanding. He will be seen at the [facility]. TN did leave the number for [facility] to establish care with a PCP after that. I also faxed clinicals, referral, and cath procedure to [facility]. I informed patient they will call him to schedule.    Krys STEWART verified medications delivered at bedside.     Patient also reported Medicaid rep came to screen him.    Future Appointments   Date Time Provider Department Center   9/20/2018  1:00 PM Yesi Lewis MD [facility]     Follow-up With  Details  Why  Contact [facility]  Call  Cardiology Follow-Up--They will call you to schedule.  [address] [facility] [address]  [phone]   Yesi Lewis MD  On 9/20/2018  at 1:00 pm, Primary Care Physician  [address]  [address]  [place] [address]  [phone]   [facility]    Primary Care Physician--To establish care after being seen at the [facility].  [address], [address]  [place] [id number] [phone]      09/12/18 1254   Final Note   Assessment Type Final Discharge Note   Discharge Disposition Home   What phone number can be called within the next 1-3 days to see how you are doing after discharge? [phone]   Hospital Follow Up  Appt(s) scheduled? Yes   Discharge plans and expectations educations in teach back method with documentation complete? Yes   Right Care Referral Info   Post Acute Recommendation No Care     Cici Finch RN  Transition Navigator  [phone]

## 2018-09-12 NOTE — DISCHARGE SUMMARY
[facility] Hospital Medicine Discharge Summary    Primary Team: [facility] Hospitalist Team A  Attending Physician: Ceci att. providers found  Resident: Florencia  Intern: Carolina     Date of Admit: 9/11/2018  Date of Discharge: 9/12/2018    Discharge to: Home  Condition: Stable     Discharge Diagnoses     Patient Active Problem List   Diagnosis    Chest pain    Essential hypertension    Tobacco abuse    Coronary artery disease involving native coronary artery    Aortic insufficiency    Elevated troponin       Consultants and Procedures     Consultants:  Cardiology    Procedures:   LHC:  Left main: normal  LAD: normal  Cx: normal  Right: normal  Ramus: large with at least an 80% lesion in its mid portion; iFR .79. Thus, primary stent done without complications.    Imaging:  CXR: Coarsened interstitial lung markings    Brief History of Present Illness      Celso Dallas is a 48 y.o. AA male who  has no past medical history on file.. The patient presented to [facility] on 9/11/2018 with a primary complaint of chest pain x 1 hour.     The patient was in their usual state of health until about a week prior to presentation when he had an episode of chest pain while laying down, which he reports went away on its own. He had an additional milder episode later in the week which also went away on its own. Then on evening of admission (9/10), pt had onset of an episode of chest pain at rest described as sharp 8/10 pain in mid-chest, non-radiating. The pain continued and he had an episode of vomiting as well as diaphoresis, so he decided to call EMS. EMS reportedly gave him ASA and nitro which finally relieved his pain; BP after administration of the ASA and nitro was in 130s systolic. Pt denies SOB, syncope, headache, vision changes upon arrival to ED.      For the full HPI please refer to the History & Physical from this admission.    Hospital Course By Problem with Pertinent Findings     NSTEMI  - atypical angina  (intermittent chest pain at rest x3 in past week, usually when seated/laying down), pain relieved with ASA and nitroglycerin given by EMS  - EKG with NSR, LVH, left axis deviation; no ST changes  - Trop 0.046 --> 0.85  - TTE with grade I diastolic dysfxn, bicuspid aortic valve with mild AI, EF 60-65%, no WMA  - Taken to cath lab for LHC, R radial approach, s/p JOAO to L ramus with 80% stenosis, minimal dz elsewhere  - ASA, brillinta, statin, coreg, lisinopril  - Follow up with Cards Dr. Pearce  - counseled on smoking cessation, BP control     Aortic Insuffiencey  - seen on TTE  - bicuspid aortic valve  - will control BP as below   - to follow up with Cards     Essential HTN  - pt recently diagnosed with HTN and on lisinopril at home, unknown dose, but ran out 3 weeks PTA  - started lisinopril 40, amplodine 10, coreg 12.5  - to be titrated by PCP     Tobacco Abuse  - current 1.5ppd smoker  - counseled on importance of cessation; pt amenable to quitting and signed up with smoking cessation trust      HCM  - A1c 5.2  - lipid panel WNL  - given pneumovax prior to discharge  - Needs PCP, referral placed to [facility] at [facility]    Discharge Medications        Medication List      START taking these medications    amLODIPine 10 MG tablet  Commonly known as:  NORVASC  Take 1 tablet (10 mg total) by mouth once daily.     aspirin 81 MG EC tablet  Commonly known as:  ECOTRIN  Take 1 tablet (81 mg total) by mouth once daily.     atorvastatin 80 MG tablet  Commonly known as:  LIPITOR  Take 1 tablet (80 mg total) by mouth every evening.     BRILINTA 90 mg tablet  Generic drug:  ticagrelor  Take 1 tablet (90 mg total) by mouth 2 (two) times daily.     carvedilol 12.5 MG tablet  Commonly known as:  COREG  Take 1 tablet (12.5 mg total) by mouth 2 (two) times daily.     lisinopril 40 MG tablet  Commonly known as:  PRINIVIL,ZESTRIL  Take 1 tablet (40 mg total) by mouth once daily.           Where to Get Your Medications       These medications were sent to [facility]  [address], [place] [address]    Hours:  Mon-Fri, 8a-5:30p Phone:  [phone]   · amLODIPine 10 MG tablet  · atorvastatin 80 MG tablet  · BRILINTA 90 mg tablet  · carvedilol 12.5 MG tablet  · lisinopril 40 MG tablet     You can get these medications from any pharmacy    You don't need a prescription for these medications  · aspirin 81 MG EC tablet           Discharge Information:   Diet:  Cardiac     Physical Activity:  As tolerated              Instructions:  1. Take all medications as prescribed  2. Keep all follow-up appointments  3. Return to the hospital or call your primary care physicians if any worsening symptoms such as fever, chest pain, shortness of breath, return of symptoms, or any other concerns.    Follow-Up Appointments:  Follow-up Information     Call [facility].    Specialties:  Neurosurgery, Plastic Surgery, Podiatry, Surgical Oncology, Allergy, Cardiothoracic Surgery, Otolaryngology, Gastroenterology, Breast Surgery, Oral Surgery, Oral and Maxillofacial Surgery, Cardiology, Bariatrics, Internal Medicine, Family Medicine, Colon and Rectal Surgery, Dental General Practice, Gynecology, Orthopedic Surgery, Genetics, Endocrinology, Vascular Surgery, Physical Medicine and Rehabilitation, Urology, Neurology, Dermatology, Rheumatology, Occupational Therapy, Ophthalmology, Optometry  Why:  Cardiology Follow-Up--They will call you to schedule.  Contact information:  [address] [address]  [phone]             Yesi Lewis MD On 9/20/2018.    Specialty:  Hospitalist  Why:  at 1:00 pm, Primary Care Physician  Contact information:  [address]  [address]  [place] [address]  [phone]             [facility].    Specialty:  Family Medicine  Why:  Primary Care Physician--To establish care after being seen at the [facility].  Contact  information:  [address], [address]  [place] [address] [phone]                   Uziel Don MD  [facility]/Pediatrics, HO-III

## 2018-09-12 NOTE — PROGRESS NOTES
"[facility] Medicine Progress Note    Primary Team: [facility] Hospitalist Team A  Attending Physician: Maryan Castro MD  Resident: Manolo  Intern: Erik    Subjective:      Patient doing well this AM, no new issues.  Denies chest pain, shortness of breath.  Educated on DAPT and importance of smoking cessation.     Objective:     Last 24 Hour Vital Signs:  BP  Min: 131/85  Max: 187/115  Temp  Av.2 °F (36.8 °C)  Min: 98.1 °F (36.7 °C)  Max: 98.3 °F (36.8 °C)  Pulse  Av.2  Min: 50  Max: 84  Resp  Av.6  Min: 12  Max: 22  SpO2  Av.8 %  Min: 96 %  Max: 100 %  Height  Av' 4" (162.6 cm)  Min: 5' 4" (162.6 cm)  Max: 5' 4" (162.6 cm)  Weight  Av.6 kg (149 lb 0.5 oz)  Min: 67.6 kg (149 lb 0.5 oz)  Max: 67.6 kg (149 lb 0.5 oz)  I/O last 3 completed shifts:  In: 240 [P.O.:240]  Out: -     Physical Examination:  Gen: alert and oriented, comfortable, does not appear in distress  Head: normocephalic, atraumatic  Eyes: PERLLA, EOM intact, sclera and conjunctiva clear, no icterus  ENT: external ear canals clear.  Moist mucus membranes, oropharynx clear and without exudate.  No lymphadenopathy, trachea midline, thyroid non-tender, not grossly enlarged or nodular  Cardiac: regular rate and rhythm, normal S1 and S2, no murmurs, no rubs, no extra heart sounds  Chest: no use of accessory muscles, symmetric chest rise, lung fields clear to auscultation bilaterally, no wheezes, no rales, no rhonci  Abdomen: soft, non-tender, non-distended, normoactive bowel sounds, no organomegaly  Extremities: PIV to RUE, R radial site covered with pressure dressings, warm, no cyanosis, no jaundice, no bruising, no edema  Pulses: 2+ dorsalis pedis and radial pulses bilaterally  Neuro: Gen: alert, oriented to person, place, time, no deficits      Laboratory:  Laboratory Data Reviewed:   Lab Results   Component Value Date    WBC 8.12 2018    HGB 14.7 2018    HCT 43.4 2018    MCV 84 2018     2018 "     -Trop 0.046 --> 0.85    A1C: 5.2      Other Results:  EKG (my interpretation): NSR,  LVH, left axis deviation; no ST changes    Current Medications:     Infusions:       Scheduled:   amLODIPine  10 mg Oral Daily    aspirin  81 mg Oral Daily    atorvastatin  80 mg Oral QHS    lisinopril  40 mg Oral Daily    metoprolol succinate  25 mg Oral Daily    ticagrelor  90 mg Oral BID        PRN:  pneumoc 13-jv conj-dip cr(PF)    Antibiotics and Day Number of Therapy:  none    Lines and Day Number of Therapy:  PIV RUE    Assessment/Plan:     NSTEMI  - atypical angina (intermittent chest pain at rest x3 in past week, usually when seated/laying down), pain relieved with ASA and nitroglycerin given by EMS  - EKG with NSR, LVH, left axis deviation; no ST changes  -Trop 0.046 --> 0.85  -TTE with grade I diastolic dysfxn, bicuspid aortic valve with mild AI, EF 60-65%, no WMA  - Taken to cath lab for LHC, R radial approach, s/p JOAO to L ramus with 50% stenosis, minimal dz elsewhere  - ASA, brillinta, statin, coreg, lisinopril  - Follow up with Cards Dr. Pearce  - counseled on smoking cessation, BP control    Aortic Insuffiencey  - seen on TTE  - bicuspid aortic valve  - BP control  - to follow up with Cards     Essential HTN  -pt recently diagnosed with HTN and on lisinopril at home, unknown dose, but ran out 3 weeks ago  -started lisinopril 40, amplodine 10, coreg 12.5  - to be titrated by PCP    Tobacco Abuse  - current 1.5ppd smoker  - counseled on importance of cessation     HCM  - A1c 5.2  - lipid panel WNL  - given pneumovax prior to discharge  - Needs PCP, referral placed to [facility]      Diet: regular  VTE PPx: SCDs  Code Status: full    Solitario Valencia MD  [facility] HO-I  [facility] Hospitalist Medicine Team A    [facility] Hospitalist Pager numbers:   [facility] Hospitalist Medicine Team A (Gloria/Haylie): [phone]  [facility] Hospitalist Medicine Team B (Jorge/Vaishali):  [phone]

## 2018-09-12 NOTE — PLAN OF CARE
Admitting Team: hospitals Hospitalist Team a  Attending Physician: Maryan Castro MD  Resident: Dr. Dawn Mock   Intern: Dr. Anjelica Lopez  Night Float: Dr. Bennett Gutierrez MD called that patient having consistently elevated BP to the 170-180s systolic on manual blood pressure readings. Otherwise without complaints. No chest pain, dyspnea, diaphoresis, headache, vision changes or discomfort. Chart reviewed. Patient admitted for NSTEMi s/p cath procedure earlier today. Patient started on Lisinopril 20mg and Norvasc 10mg today.     Plan: started metoprolol 25mg BID    Bennett Gutierrez Jr., MD  HO-1 [facility] Internal Medicine  [facility]    hospitals Medicine Hospitalist Pager numbers:   hospitals Hospitalist Medicine Team A (Gloria/Haylie): [phone]  hospitals Hospitalist Medicine Team B (Jorge/Vaishali): [phone] 9/11/2018  9:51 PM    ADDENDUM:  - BP remains elevated after 1 dose of metoprolol 25mg to high 160's/110. Heart rate 63. Will give 20mg dose of lisinopril now and increase morning dose of lisinopril to 40mg daily. Will discontinue scheduled dose of metoprolol and defer to primary team in the AM for further BP management. Will discuss with primary team potential of Coreg as next agent if needed.    Bennett Gutierrez, HO-I  1:38 AM

## 2018-09-12 NOTE — PROGRESS NOTES
Patient  is approved in our [employer] Pricing Program (Gold Card) enrollment effective thru 11/05/2018. he picked up Norvasc,Lipitor,Coreg and Lisinopril free of charge today. Restrictions apply. Retail Pharmacy must obtain ( cost transfer form) approval from Patient Assistance Technician before filling any additional prescriptions. Patient was alos given free 30 day trial of Brilinta. Must provide proof of income so that we may apply to that [employer] program for further assistance

## 2018-09-13 ENCOUNTER — PATIENT OUTREACH (OUTPATIENT)
Dept: ADMINISTRATIVE | Facility: CLINIC | Age: 48
End: 2018-09-13

## 2018-09-13 NOTE — PROGRESS NOTES
C3 nurse attempted to contact patient. No answer. The following message was left for the patient to return the call:  Good morning  I am a nurse calling on behalf of [facility] from the Care Coordination Center.  This is a Transitional Care Call for Celso Dallas. When you have a moment please contact us at [phone] or [phone] Monday through Friday, between the hours of 8 am to 4 pm. We look forward to speaking with you. On behalf of [facility] have a nice day.    The patient has a scheduled HOSFU appointment with Yesi Lewis MD on 9/20/2018  at 1:00 pm

## 2018-09-14 NOTE — PATIENT INSTRUCTIONS
Discharge Instructions for Heart Attack  You have had a heart attack (acute myocardial infarction). A heart attack occurs when a vessel that sends blood to your heart suddenly becomes blocked. This causes your heart not to work as well as it should. Follow these guidelines for home care and lifestyle changes.  Home care  · Take your medicines exactly as directed. Dont skip doses. Talk with your healthcare provider if your medicines aren't working for you. Together you can come up with another treatment plan.  · Remember that recovery after a heart attack takes time. Plan to rest for at least 4 to 8 weeks while you recover. Then return to normal activity when your doctor says its OK.  · Ask your doctor about joining a heart rehabilitation program. This can help strengthen your heart and lungs and give you more energy and confidence.  · Tell your doctor if you are feeling depressed. Feelings of sadness are common after a heart attack. But it is important to speak to someone or seek counseling if you are feeling overwhelmed by these feelings.  · Call 911 right away if you have chest pain or pain that goes to your shoulder, neck, or back. Don't drive yourself to the hospital.  · Ask your family members to learn CPR. This is an important skill that can save lives when it's needed.  · Learn to take your own blood pressure and pulse. Keep a record of your results. Ask your doctor when you should seek emergency medical attention. He or she will tell you which blood pressure reading is dangerous.  Lifestyle changes  Your heart attack might have been caused by cardiovascular disease. Your healthcare provider will work with you to make changes to your lifestyle. This will help the heart disease from getting worse. These changes will most likely be a combination of diet and exercise.  Diet  Your healthcare provider will tell you what changes you need to make to your diet. You may need to see a registered dietitian for help  with these diet changes. These changes may include:  · Cutting back on how much fat and cholesterol you eat  · Cutting back on how much salt (sodium) you eat, especially if you have high blood pressure  · Eating more fresh vegetables and fruits  · Eating lean proteins such as fish, poultry, beans, and peas, and eating less red meat and processed meats  · Using low-fat dairy products  · Using vegetable and nut oils in limited amounts  · Limiting how many sweets and processed foods such as chips, cookies, and baked goods you eat  · Limiting how often you eat out. And when you do eat out, making better food choices.  · Not eating fried or greasy foods, or foods high in saturated fat  Exercise  Your healthcare provider may tell you to get more exercise if you haven't been physically active. Depending on your case, your provider may recommend that you get moderate to vigorous physical activity for at least 40 minutes each day, and for at least 3 to 4 days each week. A few examples of moderate to vigorous activity include:  · Walking at a brisk pace, about 3 to 4 miles per hour  · Jogging or running  · Swimming or water aerobics  · Hiking  · Dancing  · Martial arts  · Tennis  · Riding a bicycle or stationary bike  Other changes  Your healthcare provider may also recommend that you:  · Lose weight. If you are overweight or obese, your provider will work with you to lose extra pounds. Making diet changes and getting more exercise can help. A good goal is to lose your 10% of your body weight in one year.  · Stop smoking. Sign up for a stop-smoking program to make it more likely for you to quit for good. You can join a stop-smoking support group. Or ask your doctor about nicotine replacement products.  · Learn to manage stress. Stress management techniques to help you deal with stress in your home and work life. This will help you feel better emotionally and ease the strain on your heart.  Follow-up  Make a follow-up  appointment as directed by our staff.     When to seek medical advice  Call 911 right away if you have:  · Chest pain that goes to your neck, jaw, back, or shoulder  · Shortness of breath  Otherwise, call your doctor immediately if you have:  · Lightheadedness, dizziness, or fainting  · Feeling of irregular heartbeat or fast pulse.   Date Last Reviewed: 10/1/2016  © 7413-0930 [employer]. [address] [place] [address], [place] [address]. All rights reserved. This information is not intended as a substitute for professional medical care. Always follow your healthcare professional's instructions.

## 2018-10-22 ENCOUNTER — HOSPITAL ENCOUNTER (OUTPATIENT)
Facility: HOSPITAL | Age: 48
End: 2018-10-22
Attending: EMERGENCY MEDICINE | Admitting: FAMILY MEDICINE

## 2018-10-22 ENCOUNTER — CLINICAL SUPPORT (OUTPATIENT)
Dept: SMOKING CESSATION | Facility: CLINIC | Age: 48
End: 2018-10-22
Payer: COMMERCIAL

## 2018-10-22 VITALS
DIASTOLIC BLOOD PRESSURE: 78 MMHG | RESPIRATION RATE: 18 BRPM | BODY MASS INDEX: 23.34 KG/M2 | HEART RATE: 85 BPM | WEIGHT: 136.69 LBS | OXYGEN SATURATION: 98 % | TEMPERATURE: 98 F | SYSTOLIC BLOOD PRESSURE: 122 MMHG | HEIGHT: 64 IN

## 2018-10-22 DIAGNOSIS — R07.9 CHEST PAIN, UNSPECIFIED TYPE: ICD-10-CM

## 2018-10-22 DIAGNOSIS — R07.9 CHEST PAIN: ICD-10-CM

## 2018-10-22 DIAGNOSIS — I10 ESSENTIAL HYPERTENSION: ICD-10-CM

## 2018-10-22 DIAGNOSIS — I25.10 CORONARY ARTERY DISEASE: ICD-10-CM

## 2018-10-22 DIAGNOSIS — I25.10 CORONARY ARTERY DISEASE INVOLVING NATIVE CORONARY ARTERY OF NATIVE HEART, ANGINA PRESENCE UNSPECIFIED: ICD-10-CM

## 2018-10-22 DIAGNOSIS — F17.210 CIGARETTE SMOKER: Primary | ICD-10-CM

## 2018-10-22 DIAGNOSIS — Z72.0 TOBACCO ABUSE: ICD-10-CM

## 2018-10-22 LAB
ALBUMIN SERPL BCP-MCNC: 4.4 G/DL
ALP SERPL-CCNC: 97 U/L
ALT SERPL W/O P-5'-P-CCNC: 34 U/L
AMPHET+METHAMPHET UR QL: NEGATIVE
ANION GAP SERPL CALC-SCNC: 18 MMOL/L
AST SERPL-CCNC: 28 U/L
BARBITURATES UR QL SCN>200 NG/ML: NEGATIVE
BASOPHILS # BLD AUTO: 0.02 K/UL
BASOPHILS NFR BLD: 0.3 %
BENZODIAZ UR QL SCN>200 NG/ML: NEGATIVE
BILIRUB SERPL-MCNC: 0.3 MG/DL
BUN SERPL-MCNC: 12 MG/DL
BZE UR QL SCN: NEGATIVE
CALCIUM SERPL-MCNC: 10 MG/DL
CANNABINOIDS UR QL SCN: NORMAL
CHLORIDE SERPL-SCNC: 107 MMOL/L
CO2 SERPL-SCNC: 16 MMOL/L
CREAT SERPL-MCNC: 0.9 MG/DL
CREAT UR-MCNC: 117.5 MG/DL
DIFFERENTIAL METHOD: NORMAL
EOSINOPHIL # BLD AUTO: 0 K/UL
EOSINOPHIL NFR BLD: 0.4 %
ERYTHROCYTE [DISTWIDTH] IN BLOOD BY AUTOMATED COUNT: 14 %
EST. GFR  (AFRICAN AMERICAN): >60 ML/MIN/1.73 M^2
EST. GFR  (NON AFRICAN AMERICAN): >60 ML/MIN/1.73 M^2
GLUCOSE SERPL-MCNC: 96 MG/DL
HCT VFR BLD AUTO: 44 %
HGB BLD-MCNC: 15.4 G/DL
LYMPHOCYTES # BLD AUTO: 2.4 K/UL
LYMPHOCYTES NFR BLD: 35.6 %
MCH RBC QN AUTO: 28.7 PG
MCHC RBC AUTO-ENTMCNC: 35 G/DL
MCV RBC AUTO: 82 FL
METHADONE UR QL SCN>300 NG/ML: NEGATIVE
MONOCYTES # BLD AUTO: 0.6 K/UL
MONOCYTES NFR BLD: 9.1 %
NEUTROPHILS # BLD AUTO: 3.7 K/UL
NEUTROPHILS NFR BLD: 54.6 %
OPIATES UR QL SCN: NEGATIVE
PCP UR QL SCN>25 NG/ML: NEGATIVE
PLATELET # BLD AUTO: 262 K/UL
PMV BLD AUTO: 9.8 FL
POTASSIUM SERPL-SCNC: 4.5 MMOL/L
PROT SERPL-MCNC: 8.1 G/DL
RBC # BLD AUTO: 5.36 M/UL
SODIUM SERPL-SCNC: 141 MMOL/L
TOXICOLOGY INFORMATION: NORMAL
TROPONIN I SERPL DL<=0.01 NG/ML-MCNC: 0.01 NG/ML
WBC # BLD AUTO: 6.72 K/UL

## 2018-10-22 PROCEDURE — 99407 BEHAV CHNG SMOKING > 10 MIN: CPT | Mod: S$GLB,,,

## 2018-10-22 PROCEDURE — G0378 HOSPITAL OBSERVATION PER HR: HCPCS

## 2018-10-22 PROCEDURE — 93010 ELECTROCARDIOGRAM REPORT: CPT | Mod: ,,, | Performed by: INTERNAL MEDICINE

## 2018-10-22 PROCEDURE — 99285 EMERGENCY DEPT VISIT HI MDM: CPT

## 2018-10-22 PROCEDURE — 94761 N-INVAS EAR/PLS OXIMETRY MLT: CPT

## 2018-10-22 PROCEDURE — 36415 COLL VENOUS BLD VENIPUNCTURE: CPT

## 2018-10-22 PROCEDURE — 93005 ELECTROCARDIOGRAM TRACING: CPT

## 2018-10-22 PROCEDURE — 63600175 PHARM REV CODE 636 W HCPCS: Performed by: STUDENT IN AN ORGANIZED HEALTH CARE EDUCATION/TRAINING PROGRAM

## 2018-10-22 PROCEDURE — 85025 COMPLETE CBC W/AUTO DIFF WBC: CPT

## 2018-10-22 PROCEDURE — 25000003 PHARM REV CODE 250: Performed by: FAMILY MEDICINE

## 2018-10-22 PROCEDURE — 80053 COMPREHEN METABOLIC PANEL: CPT

## 2018-10-22 PROCEDURE — 93010 ELECTROCARDIOGRAM REPORT: CPT | Mod: 76,,, | Performed by: INTERNAL MEDICINE

## 2018-10-22 PROCEDURE — 25000003 PHARM REV CODE 250: Performed by: EMERGENCY MEDICINE

## 2018-10-22 PROCEDURE — A4216 STERILE WATER/SALINE, 10 ML: HCPCS | Performed by: STUDENT IN AN ORGANIZED HEALTH CARE EDUCATION/TRAINING PROGRAM

## 2018-10-22 PROCEDURE — 25000003 PHARM REV CODE 250: Performed by: STUDENT IN AN ORGANIZED HEALTH CARE EDUCATION/TRAINING PROGRAM

## 2018-10-22 PROCEDURE — 84484 ASSAY OF TROPONIN QUANT: CPT | Mod: 91

## 2018-10-22 PROCEDURE — 84484 ASSAY OF TROPONIN QUANT: CPT

## 2018-10-22 PROCEDURE — 80307 DRUG TEST PRSMV CHEM ANLYZR: CPT

## 2018-10-22 RX ORDER — ATORVASTATIN CALCIUM 40 MG/1
80 TABLET, FILM COATED ORAL NIGHTLY
Status: DISCONTINUED | OUTPATIENT
Start: 2018-10-22 | End: 2018-10-22

## 2018-10-22 RX ORDER — IBUPROFEN 200 MG
1 TABLET ORAL DAILY
Status: DISCONTINUED | OUTPATIENT
Start: 2018-10-22 | End: 2018-10-22 | Stop reason: HOSPADM

## 2018-10-22 RX ORDER — ENOXAPARIN SODIUM 100 MG/ML
40 INJECTION SUBCUTANEOUS EVERY 24 HOURS
Status: DISCONTINUED | OUTPATIENT
Start: 2018-10-22 | End: 2018-10-22 | Stop reason: HOSPADM

## 2018-10-22 RX ORDER — AMLODIPINE BESYLATE 5 MG/1
10 TABLET ORAL DAILY
Status: DISCONTINUED | OUTPATIENT
Start: 2018-10-22 | End: 2018-10-22 | Stop reason: HOSPADM

## 2018-10-22 RX ORDER — HYDRALAZINE HYDROCHLORIDE 20 MG/ML
10 INJECTION INTRAMUSCULAR; INTRAVENOUS EVERY 6 HOURS PRN
Status: DISCONTINUED | OUTPATIENT
Start: 2018-10-22 | End: 2018-10-22 | Stop reason: HOSPADM

## 2018-10-22 RX ORDER — NITROGLYCERIN 0.4 MG/1
0.4 TABLET SUBLINGUAL EVERY 5 MIN PRN
Status: DISCONTINUED | OUTPATIENT
Start: 2018-10-22 | End: 2018-10-22 | Stop reason: HOSPADM

## 2018-10-22 RX ORDER — SODIUM CHLORIDE 0.9 % (FLUSH) 0.9 %
3 SYRINGE (ML) INJECTION EVERY 8 HOURS
Status: DISCONTINUED | OUTPATIENT
Start: 2018-10-22 | End: 2018-10-22 | Stop reason: HOSPADM

## 2018-10-22 RX ORDER — LISINOPRIL 20 MG/1
40 TABLET ORAL DAILY
Status: DISCONTINUED | OUTPATIENT
Start: 2018-10-22 | End: 2018-10-22 | Stop reason: HOSPADM

## 2018-10-22 RX ORDER — MORPHINE SULFATE 4 MG/ML
2 INJECTION, SOLUTION INTRAMUSCULAR; INTRAVENOUS EVERY 4 HOURS PRN
Status: DISCONTINUED | OUTPATIENT
Start: 2018-10-22 | End: 2018-10-22 | Stop reason: HOSPADM

## 2018-10-22 RX ORDER — CARVEDILOL 12.5 MG/1
12.5 TABLET ORAL 2 TIMES DAILY
Status: DISCONTINUED | OUTPATIENT
Start: 2018-10-22 | End: 2018-10-22 | Stop reason: HOSPADM

## 2018-10-22 RX ORDER — ACETAMINOPHEN 325 MG/1
650 TABLET ORAL EVERY 6 HOURS PRN
Status: DISCONTINUED | OUTPATIENT
Start: 2018-10-22 | End: 2018-10-22 | Stop reason: HOSPADM

## 2018-10-22 RX ORDER — ASPIRIN 325 MG
325 TABLET ORAL
Status: COMPLETED | OUTPATIENT
Start: 2018-10-22 | End: 2018-10-22

## 2018-10-22 RX ORDER — ASPIRIN 81 MG/1
81 TABLET ORAL DAILY
Status: DISCONTINUED | OUTPATIENT
Start: 2018-10-22 | End: 2018-10-22 | Stop reason: HOSPADM

## 2018-10-22 RX ORDER — ATORVASTATIN CALCIUM 40 MG/1
80 TABLET, FILM COATED ORAL NIGHTLY
Status: DISCONTINUED | OUTPATIENT
Start: 2018-10-22 | End: 2018-10-22 | Stop reason: HOSPADM

## 2018-10-22 RX ADMIN — CARVEDILOL 12.5 MG: 12.5 TABLET, FILM COATED ORAL at 08:10

## 2018-10-22 RX ADMIN — TICAGRELOR 180 MG: 90 TABLET ORAL at 04:10

## 2018-10-22 RX ADMIN — NITROGLYCERIN 1 INCH: 20 OINTMENT TOPICAL at 03:10

## 2018-10-22 RX ADMIN — AMLODIPINE BESYLATE 10 MG: 5 TABLET ORAL at 08:10

## 2018-10-22 RX ADMIN — ASPIRIN 81 MG: 81 TABLET, COATED ORAL at 08:10

## 2018-10-22 RX ADMIN — ASPIRIN 325 MG ORAL TABLET 325 MG: 325 PILL ORAL at 03:10

## 2018-10-22 RX ADMIN — ACETAMINOPHEN 650 MG: 325 TABLET ORAL at 08:10

## 2018-10-22 RX ADMIN — MORPHINE SULFATE 2 MG: 4 INJECTION INTRAVENOUS at 05:10

## 2018-10-22 RX ADMIN — ATORVASTATIN CALCIUM 80 MG: 40 TABLET, FILM COATED ORAL at 04:10

## 2018-10-22 RX ADMIN — SODIUM CHLORIDE, PRESERVATIVE FREE 3 ML: 5 INJECTION INTRAVENOUS at 06:10

## 2018-10-22 RX ADMIN — LISINOPRIL 40 MG: 20 TABLET ORAL at 08:10

## 2018-10-22 NOTE — PROGRESS NOTES
Individual Follow-Up Form    10/22/2018    Quit Date: To be determined    Clinical Status of Patient: Inpatient    Length of Service: 15 minutes    Comments:  Smoking cessation education and handouts provided. Pt is interested in quitting smoking and was enrolled in the Ambulatory Smoking cessation program during this encounter.  (Of note for Ambulatory program providers: patient  is currently in police custody with  on guard in his hospital room).     Diagnosis: F17.210    Next Visit: Ambulatory referral to Smoking Cessation program.
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
## 2018-10-22 NOTE — DISCHARGE SUMMARY
[facility]  Discharge Summary      Admit Date: 10/22/2018    Discharge Date and Time:  10/22/2018 12:58 PM    Attending Physician: Skylar Jones MD     Discharge Physician: Eli Contreras MD    Reason for Admission: Chest Pain    Procedures Performed: None    Hospital Course (synopsis of major diagnoses, care, treatment, and services provided during the course of the hospital stay): Patient is a 48 y.o. Male with a PMH of HTN and NSTEMI (9/2018, underwent heart catheterization and found to have 80% stenosis of ramus but clear LAD) who presented to the ED on 10/22/18 with [facility] complaining of sudden onset substernal chest pain, which began as he was being taken to nursing home. Chest pain was intermittent, sharp, non-radiating, and similar in quality to that of past NSTEMI. He had associated diaphoresis and SOB. In the ED, troponin was negative and EKG showed no acute changes, similar to last one in September. Nitroglycerine and morphine were given and chest pain resolved. CXR was negative for acute pathology. Loading dose of ticagrelor was administered. The patient was admitted to Hospital Medicine for observation with [facility] Cardiology consult. Troponin was trended and remained negative x 2 with no new EKG changes. Patient was cleared by Cardiology and discharged to [facility] in stable condition with continuation of home medications.    Consults: cardiology    Significant Diagnostic Studies: Labs:   BMP:   Recent Labs   Lab 10/22/18  0249   GLU 96      K 4.5      CO2 16*   BUN 12   CREATININE 0.9   CALCIUM 10.0   , CMP   Recent Labs   Lab 10/22/18  0249      K 4.5      CO2 16*   GLU 96   BUN 12   CREATININE 0.9   CALCIUM 10.0   PROT 8.1   ALBUMIN 4.4   BILITOT 0.3   ALKPHOS 97   AST 28   ALT 34   ANIONGAP 18*   ESTGFRAFRICA >60   EGFRNONAA >60   , CBC   Recent Labs   Lab 10/22/18  0249   WBC 6.72   HGB 15.4   HCT 44.0      , Lipid Panel   Lab Results   Component  Value Date    CHOL 194 09/11/2018    HDL 74 09/11/2018    LDLCALC 107.4 09/11/2018    TRIG 63 09/11/2018    CHOLHDL 38.1 09/11/2018    and Troponin   Recent Labs   Lab 10/22/18  0546   TROPONINI 0.007  0.007     Radiology:   X-Ray Chest AP Portable   Final Result       Stable coarse interstitial markings.  No evidence of new airspace consolidation or pleural effusion.           Electronically signed by:       Jeff Hassan MD   Date:                                                10/22/2018   Time:                                                04:08     Cardiac Graphics: ECG:  Sinus rhythm, LVH.        Final Diagnoses:    Principal Problem: Chest pain   Secondary Diagnoses:   Active Hospital Problems    Diagnosis  POA    *Chest pain [R07.9]  Yes    Coronary artery disease [I25.10]  Yes    Tobacco abuse [Z72.0]  Yes    Essential hypertension [I10]  Yes      Resolved Hospital Problems   No resolved problems to display.       Discharged Condition: stable    Disposition: [facility] Department    Follow Up/Patient Instructions:   Follow up with PCP within 2 weeks for Hospital follow up   Medications:  Reconciled Home Medications:      Medication List      CONTINUE taking these medications    amLODIPine 10 MG tablet  Commonly known as:  NORVASC  Take 1 tablet (10 mg total) by mouth once daily.     aspirin 81 MG EC tablet  Commonly known as:  ECOTRIN  Take 1 tablet (81 mg total) by mouth once daily.     atorvastatin 80 MG tablet  Commonly known as:  LIPITOR  Take 1 tablet (80 mg total) by mouth every evening.     BRILINTA 90 mg tablet  Generic drug:  ticagrelor  Take 1 tablet (90 mg total) by mouth 2 (two) times daily.     carvedilol 12.5 MG tablet  Commonly known as:  COREG  Take 1 tablet (12.5 mg total) by mouth 2 (two) times daily.     lisinopril 40 MG tablet  Commonly known as:  PRINIVIL,ZESTRIL  Take 1 tablet (40 mg total) by mouth once daily.          Discharge Procedure Orders   Diet Cardiac     Notify  your health care provider if you experience any of the following:  temperature >100.4     Notify your health care provider if you experience any of the following:  persistent nausea and vomiting or diarrhea     Notify your health care provider if you experience any of the following:  redness, tenderness, or signs of infection (pain, swelling, redness, odor or green/yellow discharge around incision site)     Notify your health care provider if you experience any of the following:  difficulty breathing or increased cough     Notify your health care provider if you experience any of the following:  severe persistent headache     Notify your health care provider if you experience any of the following:  persistent dizziness, light-headedness, or visual disturbances     Activity as tolerated     Follow-up Information     [facility].    Specialty:  Emergency Medicine  Why:  As needed  Contact information:  [address]  [facility] [address] [phone]           Call [facility].    Specialty:  Family Medicine  Why:  Primary Care Physician--Call to establish care.  Contact information:  [address], [address]  [facility] [address] [phone]               >30 minutes were spent preparing this patient's discharge summary.
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
## 2018-10-22 NOTE — NURSING
Discharge discussed with pt. No changes to meds at this time. Follow up with PCP when home. Pt with  0 distress at this time. IV removed with catheter intact.Denies pain or discomfort upon discharge. Hospital transport called for assistance.

## 2018-10-22 NOTE — HPI
47 yo male w/ pmhx of NSTEMI in mid-Sept for which pt underwent heart catheterization.  At this point he was found to have his ramus 80% stenosed, but a clear LAD.  He presents today to the ED with [employer] after having sudden onset substernal chest pain that has been intermittent, sharp, non-radiating but similar in quality to his chest pain in September.  Chest pain started when pt was being taken to nursing home.  Associated with diaphoresis and SOB, pt states.    States he has been compliant with his medications although he is unsure of their exact names.    EKG does show ST elevation in the anteroseptal leads, but similar to September EKG.  Chest pain resolved initially while he was in the ED according to the ED notes, but then resumed when being interviewed by the admitting team.  ED talked with Dr. Hedrick who asked for pt to be admitted for observation with her as a consult.

## 2018-10-22 NOTE — SUBJECTIVE & OBJECTIVE
Past Medical History:   Diagnosis Date    Hypertension     NSTEMI (non-ST elevated myocardial infarction) 09/2018       History reviewed. Appendectomy    Review of patient's allergies indicates:  No Known Allergies    No current facility-administered medications on file prior to encounter.      Current Outpatient Medications on File Prior to Encounter   Medication Sig    amLODIPine (NORVASC) 10 MG tablet Take 1 tablet (10 mg total) by mouth once daily.    aspirin (ECOTRIN) 81 MG EC tablet Take 1 tablet (81 mg total) by mouth once daily.    atorvastatin (LIPITOR) 80 MG tablet Take 1 tablet (80 mg total) by mouth every evening.    carvedilol (COREG) 12.5 MG tablet Take 1 tablet (12.5 mg total) by mouth 2 (two) times daily.    lisinopril (PRINIVIL,ZESTRIL) 40 MG tablet Take 1 tablet (40 mg total) by mouth once daily.    ticagrelor (BRILINTA) 90 mg tablet Take 1 tablet (90 mg total) by mouth 2 (two) times daily.     Family History     HTN, DM- mother        Tobacco Use    Smoking status: Current Every Day Smoker     Packs/day: 1.00     Types: Cigarettes    Smokeless tobacco: Never Used   Substance and Sexual Activity    Alcohol use: Yes    Drug use: No    Sexual activity: Not on file     Review of Systems   Constitutional: Positive for diaphoresis. Negative for fever.   Respiratory: Positive for chest tightness. Negative for shortness of breath and wheezing.    Cardiovascular: Positive for chest pain.   Gastrointestinal: Negative for abdominal pain, constipation, diarrhea, nausea and vomiting.   Genitourinary: Negative for dysuria.   Musculoskeletal: Negative for gait problem.   Neurological: Negative for seizures and headaches.   Psychiatric/Behavioral: Negative for sleep disturbance.   All other systems reviewed and are negative.       Objective:     Vital Signs (Most Recent):  Temp: 97.8 °F (36.6 °C) (10/22/18 0232)  Pulse: 101 (10/22/18 0256)  Resp: (!) 24 (10/22/18 0248)  BP: (!) 148/97 (10/22/18  0256)  SpO2: 98 % (10/22/18 0256) Vital Signs (24h Range):  Temp:  [97.8 °F (36.6 °C)] 97.8 °F (36.6 °C)  Pulse:  [101-109] 101  Resp:  [15-24] 24  SpO2:  [98 %-100 %] 98 %  BP: (148-179)/() 148/97     Weight: 65.8 kg (145 lb)  Body mass index is 24.89 kg/m².    Physical Exam   Constitutional: He is oriented to person, place, and time. He appears well-developed. No distress.   HENT:   Head: Normocephalic and atraumatic.   Mouth/Throat: No oropharyngeal exudate.   Eyes: Conjunctivae and EOM are normal. Pupils are equal, round, and reactive to light.   Neck: Normal range of motion. Neck supple. No JVD present.   Cardiovascular: Normal rate, regular rhythm and normal heart sounds.   No murmur heard.  Pulmonary/Chest: Effort normal and breath sounds normal. No respiratory distress. He has no wheezes. He has no rales.   Abdominal: Soft. Bowel sounds are normal. He exhibits no distension and no mass. There is no tenderness.   Musculoskeletal: Normal range of motion. He exhibits no edema.   Neurological: He is alert and oriented to person, place, and time. No cranial nerve deficit.   Skin: Skin is warm and dry. Capillary refill takes less than 2 seconds. He is not diaphoretic.   Psychiatric: He has a normal mood and affect. His behavior is normal.   Nursing note and vitals reviewed.        CRANIAL NERVES     CN III, IV, VI   Pupils are equal, round, and reactive to light.  Extraocular motions are normal.     Significant Labs:   CBC:   Recent Labs   Lab 10/22/18  0249   WBC 6.72   HGB 15.4   HCT 44.0        CMP:   Recent Labs   Lab 10/22/18  0249      K 4.5      CO2 16*   GLU 96   BUN 12   CREATININE 0.9   CALCIUM 10.0   PROT 8.1   ALBUMIN 4.4   BILITOT 0.3   ALKPHOS 97   AST 28   ALT 34   ANIONGAP 18*   EGFRNONAA >60     Troponin:   Recent Labs   Lab 10/22/18  0249   TROPONINI 0.014       Significant Imaging: I have reviewed all pertinent imaging results/findings within the past 24 hours.     EKG  10/22/2018: Stable coarse interstitial markings.  No evidence of new airspace consolidation or pleural effusion.

## 2018-10-22 NOTE — CONSULTS
Reason for consult:   Chest pain.     History of present illness:   Celso Dallas is a 48 y.o. year old male with a past medical history of CAD with NSTEMI in September 2018 s/p JOAO to ramus intermedius artery, presented this time with chest pain, sharp and localized in the substernal area, constant since yesterday, non radiating, not associated with nausea, vomiting or diaphoresis. He has been feeling fine since his last stent placement with no chest pains until yesterday. He is able to do exercise with no pain.   His Troponin remained negative x 3 and no new EKG changes since admission. His blood pressure was 170s/90s on presentation.     Past Medical History:   Diagnosis Date    Hypertension     NSTEMI (non-ST elevated myocardial infarction) 09/2018     Medications:   amLODIPine  10 mg Oral Daily    aspirin  81 mg Oral Daily    atorvastatin  80 mg Oral QHS    carvedilol  12.5 mg Oral BID    enoxaparin  40 mg Subcutaneous Daily    lisinopril  40 mg Oral Daily    sodium chloride 0.9%  3 mL Intravenous Q8H     Allergies:  Review of patient's allergies indicates:  No Known Allergies    Social history:  Social History     Tobacco Use    Smoking status: Current Every Day Smoker     Packs/day: 1.00     Types: Cigarettes    Smokeless tobacco: Never Used   Substance Use Topics    Alcohol use: Yes    Drug use: No     Family history:  family history is not on file.    Surgical history:  History reviewed. No pertinent surgical history.  Review of systems:  Rest is negative except as in HPI.    Physical Exam:  Temp:  [97.4 °F (36.3 °C)-98.9 °F (37.2 °C)] 98.9 °F (37.2 °C)  Pulse:  [] 86  Resp:  [14-24] 24  SpO2:  [97 %-100 %] 98 %  BP: (127-179)/() 134/85  General: No acute distress, awake, alert, and oriented x 3  Chest: clear to auscultation bilaterally  CVS: regular rate and rhythm, no murmurs, rubs, or gallops, no carotid bruits, JVP 7 cm, radial, femoral, and DP 2+ bilaterally  Ext: no  edema    Lab Results   Component Value Date    TROPONINI 0.007 10/22/2018    TROPONINI 0.007 10/22/2018    TROPONINI 0.014 10/22/2018    BNP 36 09/11/2018    INR 0.9 09/11/2018    HGB 15.4 10/22/2018    HCT 44.0 10/22/2018     10/22/2018    CREATININE 0.9 10/22/2018    K 4.5 10/22/2018       EKG:  EKG: Sinus rhythm, LVH.    Echo:j 9/11/2018  CONCLUSIONS     1 - Concentric hypertrophy.     2 - No wall motion abnormalities.     3 - Normal left ventricular systolic function (EF 60-65%).     4 - Impaired LV relaxation, normal LAP (grade 1 diastolic dysfunction).     5 - Normal right ventricular systolic function .     6 - Bi-leaflet aortic valve.     7 - Mild aortic regurgitation.     [facility] 9/11/2018      1.   Single vessel coronary artery disease.   2.   Successful PCI for acute myocardial infarction.   3.   1. left main: normal.   4.   2. LAD: normal.   5.   3. CX: normal.   6.   4. Right: normal.   7.   5. ramus: moderate sized ; 80% mid lesion with iFR of .72; direct stenting done; 10% lesion proximal to stent with normal iFR after.    Assessment/Plan:  48 y.o. year old male with hx of CAD s/p JOAO to Ramus in 9/2018 presenting this time with atypical chest pain. No new EKG changes, no troponin elevation.     # Atypical chest pain  # CAD s/p JOAO to ramus in 9/2018  # HTN    C/w home medications with Asprin/brilinta 90 mg po bid/lipitor/coreg/lisinopril  On amlodipine for hypertension.   Chest pain is atypical and no troponin at this time.

## 2018-10-22 NOTE — PLAN OF CARE
Patient is discharged. No needs noted upon discharge. Patient does not have a PCP. Phone number left on paperwork to establish care.    Follow-up With  Details  Why  Contact Info   [facility]    As needed  [address]  [facility] [address] [phone]   [facility]  Call  Primary Care Physician--Call to establish care.  [address], [address]  [facility] [address] [phone]      10/22/18 1043   Final Note   Assessment Type Final Discharge Note   Discharge Disposition Home   What phone number can be called within the next 1-3 days to see how you are doing after discharge? [phone]   Hospital Follow Up  Appt(s) scheduled? No   Right Care Referral Info   Post Acute Recommendation No Care     Cici Finch RN  Transition Navigator  [phone]
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
## 2018-11-06 RX ORDER — LISINOPRIL 40 MG/1
40 TABLET ORAL DAILY
Qty: 30 TABLET | Refills: 1 | Status: CANCELLED | OUTPATIENT
Start: 2018-11-06 | End: 2019-11-06

## 2018-11-06 RX ORDER — AMLODIPINE BESYLATE 10 MG/1
10 TABLET ORAL DAILY
Qty: 30 TABLET | Refills: 1 | Status: CANCELLED | OUTPATIENT
Start: 2018-11-06 | End: 2019-11-06

## 2018-11-06 RX ORDER — CARVEDILOL 12.5 MG/1
12.5 TABLET ORAL 2 TIMES DAILY
Qty: 60 TABLET | Refills: 1 | Status: CANCELLED | OUTPATIENT
Start: 2018-11-06 | End: 2019-11-06

## 2018-11-06 RX ORDER — ATORVASTATIN CALCIUM 80 MG/1
80 TABLET, FILM COATED ORAL NIGHTLY
Qty: 30 TABLET | Refills: 1 | Status: CANCELLED | OUTPATIENT
Start: 2018-11-06 | End: 2019-11-06

## 2018-12-11 RX ORDER — ATORVASTATIN CALCIUM 80 MG/1
80 TABLET, FILM COATED ORAL NIGHTLY
Qty: 30 TABLET | Refills: 1 | Status: CANCELLED | OUTPATIENT
Start: 2018-12-11 | End: 2019-12-11

## 2018-12-11 RX ORDER — LISINOPRIL 40 MG/1
40 TABLET ORAL DAILY
Qty: 30 TABLET | Refills: 1 | Status: CANCELLED | OUTPATIENT
Start: 2018-12-11 | End: 2019-12-11

## 2018-12-11 RX ORDER — AMLODIPINE BESYLATE 10 MG/1
10 TABLET ORAL DAILY
Qty: 30 TABLET | Refills: 1 | Status: CANCELLED | OUTPATIENT
Start: 2018-12-11 | End: 2019-12-11

## 2018-12-11 RX ORDER — CARVEDILOL 12.5 MG/1
12.5 TABLET ORAL 2 TIMES DAILY
Qty: 60 TABLET | Refills: 1 | Status: CANCELLED | OUTPATIENT
Start: 2018-12-11 | End: 2019-12-11

## 2018-12-13 RX ORDER — AMLODIPINE BESYLATE 10 MG/1
10 TABLET ORAL DAILY
Qty: 30 TABLET | Refills: 1 | OUTPATIENT
Start: 2018-12-13 | End: 2019-12-13

## 2018-12-13 RX ORDER — CARVEDILOL 12.5 MG/1
12.5 TABLET ORAL 2 TIMES DAILY
Qty: 60 TABLET | Refills: 1 | OUTPATIENT
Start: 2018-12-13 | End: 2019-12-13

## 2018-12-13 RX ORDER — LISINOPRIL 40 MG/1
40 TABLET ORAL DAILY
Qty: 30 TABLET | Refills: 1 | OUTPATIENT
Start: 2018-12-13 | End: 2019-12-13

## 2018-12-13 RX ORDER — ATORVASTATIN CALCIUM 80 MG/1
80 TABLET, FILM COATED ORAL NIGHTLY
Qty: 30 TABLET | Refills: 1 | OUTPATIENT
Start: 2018-12-13 | End: 2019-12-13

## 2018-12-14 RX ORDER — AMLODIPINE BESYLATE 10 MG/1
10 TABLET ORAL DAILY
Qty: 30 TABLET | Refills: 1 | Status: CANCELLED | OUTPATIENT
Start: 2018-12-14 | End: 2019-12-14

## 2018-12-14 RX ORDER — LISINOPRIL 40 MG/1
40 TABLET ORAL DAILY
Qty: 30 TABLET | Refills: 1 | Status: CANCELLED | OUTPATIENT
Start: 2018-12-14 | End: 2019-12-14

## 2018-12-14 RX ORDER — CARVEDILOL 12.5 MG/1
12.5 TABLET ORAL 2 TIMES DAILY
Qty: 60 TABLET | Refills: 1 | Status: CANCELLED | OUTPATIENT
Start: 2018-12-14 | End: 2019-12-14

## 2018-12-14 RX ORDER — ATORVASTATIN CALCIUM 80 MG/1
80 TABLET, FILM COATED ORAL NIGHTLY
Qty: 30 TABLET | Refills: 1 | Status: CANCELLED | OUTPATIENT
Start: 2018-12-14 | End: 2019-12-14

## 2019-07-04 ENCOUNTER — HOSPITAL ENCOUNTER (EMERGENCY)
Facility: HOSPITAL | Age: 49
Discharge: HOME | End: 2019-07-04
Attending: EMERGENCY MEDICINE

## 2019-07-04 VITALS
BODY MASS INDEX: 24.75 KG/M2 | RESPIRATION RATE: 20 BRPM | SYSTOLIC BLOOD PRESSURE: 146 MMHG | TEMPERATURE: 98 F | HEART RATE: 98 BPM | OXYGEN SATURATION: 99 % | DIASTOLIC BLOOD PRESSURE: 94 MMHG | WEIGHT: 145 LBS | HEIGHT: 64 IN

## 2019-07-04 DIAGNOSIS — S01.01XA LACERATION OF SCALP, INITIAL ENCOUNTER: ICD-10-CM

## 2019-07-04 DIAGNOSIS — S09.90XA TRAUMATIC INJURY OF HEAD, INITIAL ENCOUNTER: Primary | ICD-10-CM

## 2019-07-04 PROCEDURE — 63600175 PHARM REV CODE 636 W HCPCS: Performed by: EMERGENCY MEDICINE

## 2019-07-04 PROCEDURE — 96360 HYDRATION IV INFUSION INIT: CPT

## 2019-07-04 PROCEDURE — 99284 EMERGENCY DEPT VISIT MOD MDM: CPT | Mod: 25

## 2019-07-04 PROCEDURE — 25000003 PHARM REV CODE 250: Performed by: EMERGENCY MEDICINE

## 2019-07-04 PROCEDURE — 90715 TDAP VACCINE 7 YRS/> IM: CPT | Performed by: EMERGENCY MEDICINE

## 2019-07-04 PROCEDURE — 12002 RPR S/N/AX/GEN/TRNK2.6-7.5CM: CPT

## 2019-07-04 PROCEDURE — 90471 IMMUNIZATION ADMIN: CPT | Performed by: EMERGENCY MEDICINE

## 2019-07-04 RX ORDER — SODIUM CHLORIDE 9 MG/ML
1000 INJECTION, SOLUTION INTRAVENOUS
Status: COMPLETED | OUTPATIENT
Start: 2019-07-04 | End: 2019-07-04

## 2019-07-04 RX ORDER — LIDOCAINE HYDROCHLORIDE 10 MG/ML
5 INJECTION, SOLUTION EPIDURAL; INFILTRATION; INTRACAUDAL; PERINEURAL
Status: COMPLETED | OUTPATIENT
Start: 2019-07-04 | End: 2019-07-04

## 2019-07-04 RX ADMIN — SODIUM CHLORIDE 1000 ML: 0.9 INJECTION, SOLUTION INTRAVENOUS at 01:07

## 2019-07-04 RX ADMIN — CLOSTRIDIUM TETANI TOXOID ANTIGEN (FORMALDEHYDE INACTIVATED), CORYNEBACTERIUM DIPHTHERIAE TOXOID ANTIGEN (FORMALDEHYDE INACTIVATED), BORDETELLA PERTUSSIS TOXOID ANTIGEN (GLUTARALDEHYDE INACTIVATED), BORDETELLA PERTUSSIS FILAMENTOUS HEMAGGLUTININ ANTIGEN (FORMALDEHYDE INACTIVATED), BORDETELLA PERTUSSIS PERTACTIN ANTIGEN, AND BORDETELLA PERTUSSIS FIMBRIAE 2/3 ANTIGEN 0.5 ML: 5; 2; 2.5; 5; 3; 5 INJECTION, SUSPENSION INTRAMUSCULAR at 01:07

## 2019-07-04 RX ADMIN — LIDOCAINE HYDROCHLORIDE 50 MG: 10 INJECTION, SOLUTION EPIDURAL; INFILTRATION; INTRACAUDAL; PERINEURAL at 02:07
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
## 2019-07-13 ENCOUNTER — HOSPITAL ENCOUNTER (EMERGENCY)
Facility: HOSPITAL | Age: 49
Discharge: HOME | End: 2019-07-13
Attending: EMERGENCY MEDICINE

## 2019-07-13 VITALS
SYSTOLIC BLOOD PRESSURE: 170 MMHG | OXYGEN SATURATION: 98 % | TEMPERATURE: 98 F | WEIGHT: 140 LBS | DIASTOLIC BLOOD PRESSURE: 88 MMHG | HEIGHT: 64 IN | HEART RATE: 80 BPM | BODY MASS INDEX: 23.9 KG/M2 | RESPIRATION RATE: 20 BRPM

## 2019-07-13 DIAGNOSIS — Z48.02 ENCOUNTER FOR STAPLE REMOVAL: Primary | ICD-10-CM

## 2019-07-13 PROCEDURE — 99281 EMR DPT VST MAYX REQ PHY/QHP: CPT
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
## 2019-07-13 NOTE — ED NOTES
49 year old male presents to ed cc of staple removal to back of head patient states has staples placed on 7/4 staples are intact no reddening or swelling noted to site no drainage.

## 2021-04-07 ENCOUNTER — HOSPITAL ENCOUNTER (EMERGENCY)
Facility: HOSPITAL | Age: 51
Discharge: HOME | End: 2021-04-07
Attending: EMERGENCY MEDICINE
Payer: MEDICAID

## 2021-04-07 VITALS
RESPIRATION RATE: 20 BRPM | OXYGEN SATURATION: 99 % | HEART RATE: 67 BPM | TEMPERATURE: 99 F | SYSTOLIC BLOOD PRESSURE: 159 MMHG | DIASTOLIC BLOOD PRESSURE: 96 MMHG

## 2021-04-07 DIAGNOSIS — R07.9 CHEST PAIN, UNSPECIFIED TYPE: Primary | ICD-10-CM

## 2021-04-07 LAB
ALBUMIN SERPL BCP-MCNC: 4.2 G/DL (ref 3.5–5.2)
ALP SERPL-CCNC: 74 U/L (ref 55–135)
ALT SERPL W/O P-5'-P-CCNC: 28 U/L (ref 10–44)
ANION GAP SERPL CALC-SCNC: 11 MMOL/L (ref 8–16)
AST SERPL-CCNC: 21 U/L (ref 10–40)
BASOPHILS # BLD AUTO: 0.04 K/UL (ref 0–0.2)
BASOPHILS NFR BLD: 0.5 % (ref 0–1.9)
BILIRUB SERPL-MCNC: 0.7 MG/DL (ref 0.1–1)
BNP SERPL-MCNC: <10 PG/ML (ref 0–99)
BUN SERPL-MCNC: 11 MG/DL (ref 6–20)
CALCIUM SERPL-MCNC: 9.6 MG/DL (ref 8.7–10.5)
CHLORIDE SERPL-SCNC: 101 MMOL/L (ref 95–110)
CO2 SERPL-SCNC: 23 MMOL/L (ref 23–29)
CREAT SERPL-MCNC: 0.9 MG/DL (ref 0.5–1.4)
DIFFERENTIAL METHOD: NORMAL
EOSINOPHIL # BLD AUTO: 0.1 K/UL (ref 0–0.5)
EOSINOPHIL NFR BLD: 1 % (ref 0–8)
ERYTHROCYTE [DISTWIDTH] IN BLOOD BY AUTOMATED COUNT: 14 % (ref 11.5–14.5)
EST. GFR  (AFRICAN AMERICAN): >60 ML/MIN/1.73 M^2
EST. GFR  (NON AFRICAN AMERICAN): >60 ML/MIN/1.73 M^2
GLUCOSE SERPL-MCNC: 99 MG/DL (ref 70–110)
HCT VFR BLD AUTO: 45.2 % (ref 40–54)
HGB BLD-MCNC: 15.4 G/DL (ref 14–18)
IMM GRANULOCYTES # BLD AUTO: 0.02 K/UL (ref 0–0.04)
IMM GRANULOCYTES NFR BLD AUTO: 0.3 % (ref 0–0.5)
INR PPP: 0.9 (ref 0.8–1.2)
LYMPHOCYTES # BLD AUTO: 3.2 K/UL (ref 1–4.8)
LYMPHOCYTES NFR BLD: 39.4 % (ref 18–48)
MCH RBC QN AUTO: 29.3 PG (ref 27–31)
MCHC RBC AUTO-ENTMCNC: 34.1 G/DL (ref 32–36)
MCV RBC AUTO: 86 FL (ref 82–98)
MONOCYTES # BLD AUTO: 0.9 K/UL (ref 0.3–1)
MONOCYTES NFR BLD: 10.9 % (ref 4–15)
NEUTROPHILS # BLD AUTO: 3.8 K/UL (ref 1.8–7.7)
NEUTROPHILS NFR BLD: 47.9 % (ref 38–73)
NRBC BLD-RTO: 0 /100 WBC
PLATELET # BLD AUTO: 297 K/UL (ref 150–450)
PMV BLD AUTO: 9.9 FL (ref 9.2–12.9)
POTASSIUM SERPL-SCNC: 4.4 MMOL/L (ref 3.5–5.1)
PROT SERPL-MCNC: 7.4 G/DL (ref 6–8.4)
PROTHROMBIN TIME: 9.9 SEC (ref 9–12.5)
RBC # BLD AUTO: 5.26 M/UL (ref 4.6–6.2)
SODIUM SERPL-SCNC: 135 MMOL/L (ref 136–145)
TROPONIN I SERPL DL<=0.01 NG/ML-MCNC: <0.006 NG/ML (ref 0–0.03)
TROPONIN I SERPL DL<=0.01 NG/ML-MCNC: <0.006 NG/ML (ref 0–0.03)
WBC # BLD AUTO: 7.99 K/UL (ref 3.9–12.7)

## 2021-04-07 PROCEDURE — 80053 COMPREHEN METABOLIC PANEL: CPT | Performed by: EMERGENCY MEDICINE

## 2021-04-07 PROCEDURE — 84484 ASSAY OF TROPONIN QUANT: CPT | Performed by: EMERGENCY MEDICINE

## 2021-04-07 PROCEDURE — 84484 ASSAY OF TROPONIN QUANT: CPT | Mod: 91 | Performed by: EMERGENCY MEDICINE

## 2021-04-07 PROCEDURE — 99284 EMERGENCY DEPT VISIT MOD MDM: CPT | Mod: 25

## 2021-04-07 PROCEDURE — 85025 COMPLETE CBC W/AUTO DIFF WBC: CPT | Performed by: EMERGENCY MEDICINE

## 2021-04-07 PROCEDURE — 83880 ASSAY OF NATRIURETIC PEPTIDE: CPT | Performed by: EMERGENCY MEDICINE

## 2021-04-07 PROCEDURE — 96374 THER/PROPH/DIAG INJ IV PUSH: CPT

## 2021-04-07 PROCEDURE — 85610 PROTHROMBIN TIME: CPT | Performed by: EMERGENCY MEDICINE

## 2021-04-07 PROCEDURE — 63600175 PHARM REV CODE 636 W HCPCS: Performed by: EMERGENCY MEDICINE

## 2021-04-07 PROCEDURE — 25000003 PHARM REV CODE 250: Performed by: EMERGENCY MEDICINE

## 2021-04-07 RX ORDER — ASPIRIN 325 MG
325 TABLET ORAL
Status: COMPLETED | OUTPATIENT
Start: 2021-04-07 | End: 2021-04-07

## 2021-04-07 RX ORDER — MORPHINE SULFATE 2 MG/ML
2 INJECTION, SOLUTION INTRAMUSCULAR; INTRAVENOUS
Status: COMPLETED | OUTPATIENT
Start: 2021-04-07 | End: 2021-04-07

## 2021-04-07 RX ADMIN — MORPHINE SULFATE 2 MG: 2 INJECTION, SOLUTION INTRAMUSCULAR; INTRAVENOUS at 10:04

## 2021-04-07 RX ADMIN — ASPIRIN 325 MG ORAL TABLET 325 MG: 325 PILL ORAL at 09:04

## 2021-04-15 ENCOUNTER — HOSPITAL ENCOUNTER (EMERGENCY)
Facility: HOSPITAL | Age: 51
Discharge: HOME | End: 2021-04-16
Attending: EMERGENCY MEDICINE
Payer: MEDICAID
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
## 2021-04-15 DIAGNOSIS — S09.90XA INJURY OF HEAD, INITIAL ENCOUNTER: ICD-10-CM

## 2021-04-15 PROCEDURE — 99284 EMERGENCY DEPT VISIT MOD MDM: CPT

## 2021-04-16 VITALS
SYSTOLIC BLOOD PRESSURE: 136 MMHG | HEIGHT: 64 IN | DIASTOLIC BLOOD PRESSURE: 90 MMHG | RESPIRATION RATE: 16 BRPM | TEMPERATURE: 98 F | HEART RATE: 94 BPM | BODY MASS INDEX: 22.53 KG/M2 | WEIGHT: 132 LBS | OXYGEN SATURATION: 97 %

## 2022-05-31 ENCOUNTER — HOSPITAL ENCOUNTER (EMERGENCY)
Facility: HOSPITAL | Age: 52
Discharge: HOME | End: 2022-05-31
Attending: EMERGENCY MEDICINE
Payer: MEDICAID

## 2022-05-31 VITALS
BODY MASS INDEX: 22.53 KG/M2 | HEIGHT: 64 IN | TEMPERATURE: 98 F | DIASTOLIC BLOOD PRESSURE: 87 MMHG | RESPIRATION RATE: 19 BRPM | SYSTOLIC BLOOD PRESSURE: 129 MMHG | HEART RATE: 76 BPM | WEIGHT: 132 LBS | OXYGEN SATURATION: 99 %

## 2022-05-31 DIAGNOSIS — R07.9 CHEST PAIN: Primary | ICD-10-CM

## 2022-05-31 DIAGNOSIS — I10 HYPERTENSION, UNSPECIFIED TYPE: ICD-10-CM

## 2022-05-31 LAB
ALBUMIN SERPL BCP-MCNC: 4 G/DL (ref 3.5–5.2)
ALP SERPL-CCNC: 85 U/L (ref 55–135)
ALT SERPL W/O P-5'-P-CCNC: 29 U/L (ref 10–44)
ANION GAP SERPL CALC-SCNC: 14 MMOL/L (ref 8–16)
AST SERPL-CCNC: 34 U/L (ref 10–40)
BASOPHILS # BLD AUTO: 0.03 K/UL (ref 0–0.2)
BASOPHILS NFR BLD: 0.5 % (ref 0–1.9)
BILIRUB SERPL-MCNC: 0.5 MG/DL (ref 0.1–1)
BNP SERPL-MCNC: <10 PG/ML (ref 0–99)
BUN SERPL-MCNC: 10 MG/DL (ref 6–20)
CALCIUM SERPL-MCNC: 9.7 MG/DL (ref 8.7–10.5)
CHLORIDE SERPL-SCNC: 99 MMOL/L (ref 95–110)
CO2 SERPL-SCNC: 21 MMOL/L (ref 23–29)
CREAT SERPL-MCNC: 0.7 MG/DL (ref 0.5–1.4)
DIFFERENTIAL METHOD: ABNORMAL
EOSINOPHIL # BLD AUTO: 0 K/UL (ref 0–0.5)
EOSINOPHIL NFR BLD: 0.5 % (ref 0–8)
ERYTHROCYTE [DISTWIDTH] IN BLOOD BY AUTOMATED COUNT: 14.7 % (ref 11.5–14.5)
EST. GFR  (AFRICAN AMERICAN): >60 ML/MIN/1.73 M^2
EST. GFR  (NON AFRICAN AMERICAN): >60 ML/MIN/1.73 M^2
GLUCOSE SERPL-MCNC: 78 MG/DL (ref 70–110)
HCT VFR BLD AUTO: 46 % (ref 40–54)
HGB BLD-MCNC: 15.4 G/DL (ref 14–18)
IMM GRANULOCYTES # BLD AUTO: 0.01 K/UL (ref 0–0.04)
IMM GRANULOCYTES NFR BLD AUTO: 0.2 % (ref 0–0.5)
LYMPHOCYTES # BLD AUTO: 1.9 K/UL (ref 1–4.8)
LYMPHOCYTES NFR BLD: 30.3 % (ref 18–48)
MCH RBC QN AUTO: 28.6 PG (ref 27–31)
MCHC RBC AUTO-ENTMCNC: 33.5 G/DL (ref 32–36)
MCV RBC AUTO: 85 FL (ref 82–98)
MONOCYTES # BLD AUTO: 0.9 K/UL (ref 0.3–1)
MONOCYTES NFR BLD: 13.8 % (ref 4–15)
NEUTROPHILS # BLD AUTO: 3.4 K/UL (ref 1.8–7.7)
NEUTROPHILS NFR BLD: 54.7 % (ref 38–73)
NRBC BLD-RTO: 0 /100 WBC
PLATELET # BLD AUTO: 281 K/UL (ref 150–450)
PMV BLD AUTO: 10 FL (ref 9.2–12.9)
POTASSIUM SERPL-SCNC: 4.8 MMOL/L (ref 3.5–5.1)
PROT SERPL-MCNC: 7.6 G/DL (ref 6–8.4)
RBC # BLD AUTO: 5.39 M/UL (ref 4.6–6.2)
SODIUM SERPL-SCNC: 134 MMOL/L (ref 136–145)
TROPONIN I SERPL DL<=0.01 NG/ML-MCNC: 0.02 NG/ML (ref 0–0.03)
TROPONIN I SERPL DL<=0.01 NG/ML-MCNC: 0.03 NG/ML (ref 0–0.03)
WBC # BLD AUTO: 6.17 K/UL (ref 3.9–12.7)

## 2022-05-31 PROCEDURE — 80053 COMPREHEN METABOLIC PANEL: CPT | Performed by: EMERGENCY MEDICINE

## 2022-05-31 PROCEDURE — 25000003 PHARM REV CODE 250: Performed by: EMERGENCY MEDICINE

## 2022-05-31 PROCEDURE — 85025 COMPLETE CBC W/AUTO DIFF WBC: CPT | Performed by: EMERGENCY MEDICINE

## 2022-05-31 PROCEDURE — 84484 ASSAY OF TROPONIN QUANT: CPT | Mod: 91 | Performed by: EMERGENCY MEDICINE

## 2022-05-31 PROCEDURE — 83880 ASSAY OF NATRIURETIC PEPTIDE: CPT | Performed by: EMERGENCY MEDICINE

## 2022-05-31 PROCEDURE — 99284 EMERGENCY DEPT VISIT MOD MDM: CPT | Mod: 25

## 2022-05-31 PROCEDURE — 93010 ELECTROCARDIOGRAM REPORT: CPT | Mod: ,,, | Performed by: INTERNAL MEDICINE

## 2022-05-31 PROCEDURE — 93010 EKG 12-LEAD: ICD-10-PCS | Mod: ,,, | Performed by: INTERNAL MEDICINE

## 2022-05-31 PROCEDURE — 93005 ELECTROCARDIOGRAM TRACING: CPT

## 2022-05-31 RX ORDER — AMLODIPINE BESYLATE 5 MG/1
10 TABLET ORAL
Status: COMPLETED | OUTPATIENT
Start: 2022-05-31 | End: 2022-05-31

## 2022-05-31 RX ORDER — ATORVASTATIN CALCIUM 40 MG/1
40 TABLET, FILM COATED ORAL DAILY
COMMUNITY
Start: 2022-02-20 | End: 2022-05-31 | Stop reason: SDUPTHER

## 2022-05-31 RX ORDER — LISINOPRIL 30 MG/1
30 TABLET ORAL DAILY
COMMUNITY
End: 2022-05-31 | Stop reason: SDUPTHER

## 2022-05-31 RX ORDER — OMEPRAZOLE 40 MG/1
40 CAPSULE, DELAYED RELEASE ORAL EVERY MORNING
Qty: 30 CAPSULE | Refills: 1 | Status: SHIPPED | OUTPATIENT
Start: 2022-05-31 | End: 2022-07-30

## 2022-05-31 RX ORDER — LISINOPRIL 30 MG/1
30 TABLET ORAL DAILY
Qty: 30 TABLET | Refills: 1 | Status: SHIPPED | OUTPATIENT
Start: 2022-05-31 | End: 2022-09-27 | Stop reason: SDUPTHER

## 2022-05-31 RX ORDER — ASPIRIN 325 MG
325 TABLET ORAL
Status: COMPLETED | OUTPATIENT
Start: 2022-05-31 | End: 2022-05-31

## 2022-05-31 RX ORDER — OMEPRAZOLE 40 MG/1
40 CAPSULE, DELAYED RELEASE ORAL EVERY MORNING
COMMUNITY
Start: 2022-02-03 | End: 2022-05-31 | Stop reason: SDUPTHER

## 2022-05-31 RX ORDER — LISINOPRIL 10 MG/1
40 TABLET ORAL
Status: COMPLETED | OUTPATIENT
Start: 2022-05-31 | End: 2022-05-31

## 2022-05-31 RX ORDER — LISINOPRIL 30 MG/1
30 TABLET ORAL DAILY
COMMUNITY
Start: 2022-02-20 | End: 2022-05-31

## 2022-05-31 RX ORDER — CARVEDILOL 12.5 MG/1
12.5 TABLET ORAL
Status: COMPLETED | OUTPATIENT
Start: 2022-05-31 | End: 2022-05-31

## 2022-05-31 RX ORDER — AMLODIPINE BESYLATE 10 MG/1
10 TABLET ORAL DAILY
Qty: 30 TABLET | Refills: 1 | Status: SHIPPED | OUTPATIENT
Start: 2022-05-31 | End: 2022-09-27 | Stop reason: SDUPTHER

## 2022-05-31 RX ORDER — ATORVASTATIN CALCIUM 40 MG/1
40 TABLET, FILM COATED ORAL DAILY
Qty: 30 TABLET | Refills: 1 | Status: SHIPPED | OUTPATIENT
Start: 2022-05-31 | End: 2022-09-27 | Stop reason: SDUPTHER

## 2022-05-31 RX ORDER — ASPIRIN 81 MG/1
81 TABLET ORAL DAILY
Refills: 0 | COMMUNITY
Start: 2022-05-31 | End: 2023-05-31

## 2022-05-31 RX ADMIN — AMLODIPINE BESYLATE 10 MG: 5 TABLET ORAL at 09:05

## 2022-05-31 RX ADMIN — LISINOPRIL 40 MG: 10 TABLET ORAL at 09:05

## 2022-05-31 RX ADMIN — CARVEDILOL 12.5 MG: 12.5 TABLET, FILM COATED ORAL at 09:05

## 2022-05-31 RX ADMIN — ASPIRIN 325 MG ORAL TABLET 325 MG: 325 PILL ORAL at 09:05

## 2022-05-31 NOTE — ED TRIAGE NOTES
Pt reports to ED with intermittent, non radiating left sided chest pain. Upon arrival to ED, pt reports pain 0/10. Pt has been out of BP meds x3 days. Pt denies fever, sob, abdominal pain,n/v/d. Pt aaox4, NAD noted

## 2022-05-31 NOTE — ED NOTES
Pt sitting up in bed, respirations even/unlabored. NAD Noted. Updated pt on poc, pt denies any needs at this time. Side rails upx2, call bell within reach. Will continue to monitor

## 2022-05-31 NOTE — PHARMACY MED REC
"Admission Medication History     The home medication history was taken by Samia Coppola CPhT.    Medication history obtained from, Patient Verified    You may go to "Admission" then "Reconcile Home Medications" tabs to review and/or act upon these items.      The home medication list has been updated by the Pharmacy department.    Please read ALL comments highlighted in yellow.    Please address this information as you see fit.     Feel free to contact us if you have any questions or require assistance.      The medications listed below were removed from the home medication list.  Please reorder if appropriate:  Patient reports no longer taking the following medication(s):   Carvedilol 12.5 mg   Brilinta 90 mg        Samia Coppola CPhT.  Ext [phone]                .          "

## 2022-05-31 NOTE — ED PROVIDER NOTES
Encounter Date: 5/31/2022    SCRIBE #1 NOTE: I, Selene Lancaster, am scribing for, and in the presence of, Venancio Matthews MD.       History     Chief Complaint   Patient presents with    Chest Pain     Pt presents to ED today c/o left chest pain onset this am, pt reports out of blood pressure mediation x 3 days and reports previous stent placement x 4 years ago      This is a 52 y.o. male who has a past medical history of Hypertension and NSTEMI (non-ST elevated myocardial infarction) (09/2018).     The patient presents to the Emergency Department with left-sided chest pain that onset one hour prior to arrival.   His symptoms have been constant since onset and are non-radiating. Currently, his symptoms have resolved.   Pt denies shortness of breath, headache, dizziness, nausea, vomiting and heart palpitations.   He reveals he has been without Amlodipine for 3 days and that he requires a refill.   Patient has had prior history of similar symptoms. He has a history of Coronary artery disease and had an NSTEMI in 2018.       The history is provided by the patient.     Review of patient's allergies indicates:  No Known Allergies  Past Medical History:   Diagnosis Date    Hypertension     NSTEMI (non-ST elevated myocardial infarction) 09/2018     No past surgical history on file.  No family history on file.  Social History     Tobacco Use    Smoking status: Current Every Day Smoker     Packs/day: 1.00     Types: Cigarettes    Smokeless tobacco: Never Used   Substance Use Topics    Alcohol use: Yes    Drug use: No     Review of Systems   Constitutional: Negative for fever.   HENT: Negative for sore throat.    Respiratory: Negative for shortness of breath.    Cardiovascular: Positive for chest pain. Negative for palpitations.   Gastrointestinal: Negative for nausea and vomiting.   Genitourinary: Negative for dysuria.   Musculoskeletal: Negative for back pain.   Skin: Negative for rash.   Neurological: Negative for  dizziness, weakness and headaches.   Hematological: Does not bruise/bleed easily.       Physical Exam     Initial Vitals [05/31/22 0900]   BP Pulse Resp Temp SpO2   (!) 179/111 98 19 97.9 °F (36.6 °C) 98 %      MAP       --         Physical Exam    Nursing note and vitals reviewed.  Constitutional: He appears well-developed and well-nourished. No distress.   HENT:   Head: Normocephalic and atraumatic.   Mouth/Throat: Oropharynx is clear and moist.   Eyes: Conjunctivae are normal. Pupils are equal, round, and reactive to light.   Neck: Neck supple.   Normal range of motion.  Cardiovascular: Normal rate, regular rhythm, normal heart sounds and intact distal pulses.   Pulmonary/Chest: Breath sounds normal. No respiratory distress.   Abdominal: Abdomen is soft. Bowel sounds are normal. He exhibits no distension. There is no abdominal tenderness.   Musculoskeletal:         General: No tenderness or edema. Normal range of motion.      Cervical back: Normal range of motion and neck supple.     Lymphadenopathy:     He has no cervical adenopathy.   Neurological: He is alert and oriented to person, place, and time.   Skin: Skin is warm and dry. Capillary refill takes less than 2 seconds. No rash noted. No erythema.   Psychiatric: He has a normal mood and affect. Thought content normal.         ED Course   Procedures  Labs Reviewed   CBC W/ AUTO DIFFERENTIAL - Abnormal; Notable for the following components:       Result Value    RDW 14.7 (*)     All other components within normal limits   COMPREHENSIVE METABOLIC PANEL - Abnormal; Notable for the following components:    Sodium 134 (*)     CO2 21 (*)     All other components within normal limits   TROPONIN I - Abnormal; Notable for the following components:    Troponin I 0.031 (*)     All other components within normal limits   B-TYPE NATRIURETIC PEPTIDE   TROPONIN I          Imaging Results    None          Medications   aspirin tablet 325 mg (325 mg Oral Given 5/31/22 0922)    amLODIPine tablet 10 mg (10 mg Oral Given 5/31/22 0922)   carvediloL tablet 12.5 mg (12.5 mg Oral Given 5/31/22 0922)   lisinopriL tablet 40 mg (40 mg Oral Given 5/31/22 0922)              Scribe Attestation:   Scribe #1: I performed the above scribed service and the documentation accurately describes the services I performed. I attest to the accuracy of the note.        ED Course as of 05/31/22 1238 Tue May 31, 2022   0913 I, Dr. Venancio Matthews, personally performed the services described in this documentation. All medical record entries made by the scribe were at my direction and in my presence. I have reviewed the chart and agree that the record is accurate and complete.   Venancio Matthews MD.   [NP]   0913 EKG:  Sinus tachycardia at 101 bpm, nl axis, left anterior fascicular block, no ST-T changes as read by me (Dr. Matthews). There are no significant changes in comparison to previous EKG on .  [NP]   0914 This is an emergent evaluation of a 52 y.o.male patient with presentation of pinpoint left-sided chest pain, nonradiating, moderate to severe, no associated symptoms.  Patient has history of CAD and hypertension, missed his last 3 days of blood pressure medication.     Initial differentials include but are not limited to:  Symptomatic hypertension, angina, unstable angina, NSTEMI, MSK pain.     Plan:  CBC, CMP, troponin, EKG, cardiac monitoring, chest x-ray, HTN meds  [NP]   1101 Troponin I(!): 0.031 [NP]   1238 Troponin I: 0.022 [NP]   1238 Second troponin negative.  Blood pressure improved.  A bed slight leak from hypertension.  History not typical for ACS.  Advised medication and dietary compliance.  Patient stable for discharge at this time.  Follow-up with referred clinic in 1 week or return for any emergent concerns. [NP]      ED Course User Index  [NP] Venancio Matthews MD             Clinical Impression:   Final diagnoses:  [R07.9] Chest pain (Primary)  [I10] Hypertension, unspecified type          ED Disposition  Condition    Discharge Stable        ED Prescriptions     Medication Sig Dispense Start Date End Date Auth. Provider    amLODIPine (NORVASC) 10 MG tablet Take 1 tablet (10 mg total) by mouth once daily. 30 tablet 5/31/2022 7/30/2022 Venancio Matthews MD    atorvastatin (LIPITOR) 40 MG tablet Take 1 tablet (40 mg total) by mouth once daily. 30 tablet 5/31/2022 7/30/2022 Venancio Matthews MD    lisinopriL (PRINIVIL,ZESTRIL) 30 MG tablet Take 1 tablet (30 mg total) by mouth once daily. 30 tablet 5/31/2022 7/30/2022 Venancio Matthews MD    omeprazole (PRILOSEC) 40 MG capsule Take 1 capsule (40 mg total) by mouth every morning. 30 capsule 5/31/2022 7/30/2022 Venancio Matthews MD    aspirin (ECOTRIN) 81 MG EC tablet Take 1 tablet (81 mg total) by mouth once daily.  5/31/2022 5/31/2023 Venancio Matthews MD        Follow-up Information     Follow up With Specialties Details Why Contact Info Additional Information    [facility] Schedule an appointment as soon as possible for a visit   [address], [address]  [place] [address] [phone] Please park in [address] or [address] and use [address]. Take Medical Office Bldg. elevators.           Venancio Matthews MD  05/31/22 [id number]

## 2022-09-27 ENCOUNTER — HOSPITAL ENCOUNTER (EMERGENCY)
Facility: HOSPITAL | Age: 52
Discharge: HOME | End: 2022-09-27
Attending: EMERGENCY MEDICINE
Payer: MEDICAID

## 2022-09-27 VITALS
RESPIRATION RATE: 18 BRPM | SYSTOLIC BLOOD PRESSURE: 163 MMHG | BODY MASS INDEX: 22.66 KG/M2 | DIASTOLIC BLOOD PRESSURE: 89 MMHG | WEIGHT: 132 LBS | TEMPERATURE: 98 F | OXYGEN SATURATION: 99 % | HEART RATE: 76 BPM

## 2022-09-27 DIAGNOSIS — M25.562 LEFT KNEE PAIN: ICD-10-CM

## 2022-09-27 DIAGNOSIS — M25.462 EFFUSION OF LEFT KNEE: Primary | ICD-10-CM

## 2022-09-27 DIAGNOSIS — M25.062 HEMARTHROSIS OF LEFT KNEE: ICD-10-CM

## 2022-09-27 DIAGNOSIS — I10 HYPERTENSION: ICD-10-CM

## 2022-09-27 LAB
APPEARANCE FLD: NORMAL
BODY FLD TYPE: NORMAL
BODY FLD TYPE: NORMAL
COLOR FLD: NORMAL
CRYSTALS FLD MICRO: NEGATIVE
EOSINOPHIL NFR FLD MANUAL: 1 %
GRAM STN SPEC: NORMAL
GRAM STN SPEC: NORMAL
LYMPHOCYTES NFR FLD MANUAL: 22 %
MONOS+MACROS NFR FLD MANUAL: 4 %
NEUTROPHILS NFR FLD MANUAL: 73 %
PATH INTERP FLD-IMP: NORMAL
TROPONIN I SERPL DL<=0.01 NG/ML-MCNC: 0.01 NG/ML (ref 0–0.03)
WBC # FLD: NORMAL /CU MM

## 2022-09-27 PROCEDURE — 84484 ASSAY OF TROPONIN QUANT: CPT | Performed by: EMERGENCY MEDICINE

## 2022-09-27 PROCEDURE — 25000003 PHARM REV CODE 250: Performed by: EMERGENCY MEDICINE

## 2022-09-27 PROCEDURE — 93010 ELECTROCARDIOGRAM REPORT: CPT | Mod: ,,, | Performed by: INTERNAL MEDICINE

## 2022-09-27 PROCEDURE — 63600175 PHARM REV CODE 636 W HCPCS: Performed by: EMERGENCY MEDICINE

## 2022-09-27 PROCEDURE — 99285 EMERGENCY DEPT VISIT HI MDM: CPT | Mod: 25

## 2022-09-27 PROCEDURE — 20610 DRAIN/INJ JOINT/BURSA W/O US: CPT | Mod: LT

## 2022-09-27 PROCEDURE — 93010 EKG 12-LEAD: ICD-10-PCS | Mod: ,,, | Performed by: INTERNAL MEDICINE

## 2022-09-27 PROCEDURE — 89051 BODY FLUID CELL COUNT: CPT | Performed by: EMERGENCY MEDICINE

## 2022-09-27 PROCEDURE — 87205 SMEAR GRAM STAIN: CPT | Performed by: EMERGENCY MEDICINE

## 2022-09-27 PROCEDURE — 93005 ELECTROCARDIOGRAM TRACING: CPT | Mod: 59

## 2022-09-27 PROCEDURE — 89060 EXAM SYNOVIAL FLUID CRYSTALS: CPT | Performed by: EMERGENCY MEDICINE

## 2022-09-27 PROCEDURE — 87070 CULTURE OTHR SPECIMN AEROBIC: CPT | Performed by: EMERGENCY MEDICINE

## 2022-09-27 RX ORDER — TRIAMCINOLONE ACETONIDE 40 MG/ML
40 INJECTION, SUSPENSION INTRA-ARTICULAR; INTRAMUSCULAR
Status: COMPLETED | OUTPATIENT
Start: 2022-09-27 | End: 2022-09-27

## 2022-09-27 RX ORDER — HYDROCODONE BITARTRATE AND ACETAMINOPHEN 5; 325 MG/1; MG/1
1 TABLET ORAL
Status: COMPLETED | OUTPATIENT
Start: 2022-09-27 | End: 2022-09-27

## 2022-09-27 RX ORDER — HYDROCODONE BITARTRATE AND ACETAMINOPHEN 5; 325 MG/1; MG/1
1 TABLET ORAL EVERY 6 HOURS PRN
Qty: 10 TABLET | Refills: 0 | Status: SHIPPED | OUTPATIENT
Start: 2022-09-27

## 2022-09-27 RX ORDER — LIDOCAINE HYDROCHLORIDE 10 MG/ML
2 INJECTION, SOLUTION EPIDURAL; INFILTRATION; INTRACAUDAL; PERINEURAL
Status: COMPLETED | OUTPATIENT
Start: 2022-09-27 | End: 2022-09-27

## 2022-09-27 RX ORDER — LISINOPRIL 40 MG/1
40 TABLET ORAL DAILY
Qty: 90 TABLET | Refills: 0 | Status: SHIPPED | OUTPATIENT
Start: 2022-09-27 | End: 2022-12-26

## 2022-09-27 RX ORDER — AMLODIPINE BESYLATE 5 MG/1
10 TABLET ORAL
Status: COMPLETED | OUTPATIENT
Start: 2022-09-27 | End: 2022-09-27

## 2022-09-27 RX ORDER — AMLODIPINE BESYLATE 10 MG/1
10 TABLET ORAL DAILY
Qty: 90 TABLET | Refills: 0 | Status: SHIPPED | OUTPATIENT
Start: 2022-09-27 | End: 2022-12-26

## 2022-09-27 RX ORDER — LISINOPRIL 10 MG/1
40 TABLET ORAL
Status: COMPLETED | OUTPATIENT
Start: 2022-09-27 | End: 2022-09-27

## 2022-09-27 RX ORDER — MELOXICAM 15 MG/1
15 TABLET ORAL DAILY
Qty: 30 TABLET | Refills: 0 | Status: SHIPPED | OUTPATIENT
Start: 2022-09-27

## 2022-09-27 RX ORDER — ATORVASTATIN CALCIUM 40 MG/1
40 TABLET, FILM COATED ORAL DAILY
Qty: 30 TABLET | Refills: 1 | Status: SHIPPED | OUTPATIENT
Start: 2022-09-27 | End: 2022-11-26

## 2022-09-27 RX ADMIN — LISINOPRIL 40 MG: 10 TABLET ORAL at 01:09

## 2022-09-27 RX ADMIN — TRIAMCINOLONE ACETONIDE 40 MG: 40 INJECTION, SUSPENSION INTRA-ARTICULAR; INTRAMUSCULAR at 01:09

## 2022-09-27 RX ADMIN — HYDROCODONE BITARTRATE AND ACETAMINOPHEN 1 TABLET: 5; 325 TABLET ORAL at 01:09

## 2022-09-27 RX ADMIN — AMLODIPINE BESYLATE 10 MG: 5 TABLET ORAL at 01:09

## 2022-09-27 RX ADMIN — LIDOCAINE HYDROCHLORIDE 20 MG: 10 INJECTION, SOLUTION EPIDURAL; INFILTRATION; INTRACAUDAL at 01:09

## 2022-09-27 NOTE — ED NOTES
Pt is attempting to call for ride home. IV removed, catheter tip intact. VSS. ABCs intact, NAD. RN advised pt to use call light when pt has ride arranged.

## 2022-09-27 NOTE — PROVIDER PROGRESS NOTES - EMERGENCY DEPT.
Encounter Date: 9/27/2022    ED Physician Progress Notes        Physician Note:   Received sign-out from Dr. Matthews.  Plan was follow-up troponin blood pressure and results arthrocentesis.  Patient blood pressure trending down troponin within normal limits.  Synovial fluid resulted, more consistent with inflammatory process as opposed to infectious.  Will plan discharge home.  Return precautions discussed.  Patient's

## 2022-09-27 NOTE — ED PROVIDER NOTES
Encounter Date: 9/27/2022       History     Chief Complaint   Patient presents with    Knee Pain     Pt arrives via EMS from home with non traumatic left knee pain that began today. Pt also reports he is out of his blood pressure medication.      HPI  This is a 52 y.o. male who has a past medical history of Hypertension and NSTEMI (non-ST elevated myocardial infarction) (09/2018).     The patient presents to the Emergency Department with atraumatic left knee pain that began today.  Associated swelling and decreased range of motion.  No fever or chills, no wounds or rash.  Symptoms are aggravated by:  Movement.  Symptoms are relieved by:  Nothing.   Patient has no prior history of similar symptoms.   Relates that he has run out of his blood pressure medication.    Denies any chest pain, shortness of breath, palpitations, headache, disturbance, back pain.    Review of patient's allergies indicates:  No Known Allergies  Past Medical History:   Diagnosis Date    Hypertension     NSTEMI (non-ST elevated myocardial infarction) 09/2018     No past surgical history on file.  No family history on file.  Social History     Tobacco Use    Smoking status: Every Day     Packs/day: 1.00     Types: Cigarettes    Smokeless tobacco: Never   Substance Use Topics    Alcohol use: Yes    Drug use: No     Review of Systems   All other systems reviewed and are negative.    Physical Exam     Initial Vitals [09/27/22 0009]   BP Pulse Resp Temp SpO2   (!) 241/116 88 20 98.9 °F (37.2 °C) 98 %      MAP       --         Physical Exam    Nursing note and vitals reviewed.  Constitutional: He appears well-developed and well-nourished. He is not diaphoretic. No distress.   HENT:   Head: Normocephalic and atraumatic.   Mouth/Throat: Oropharynx is clear and moist.   Eyes: Conjunctivae are normal. Pupils are equal, round, and reactive to light.   Neck:   Normal range of motion.  Cardiovascular:  Normal rate and regular rhythm.          "  Pulmonary/Chest: Breath sounds normal. No respiratory distress. He has no wheezes. He has no rhonchi. He has no rales.   Musculoskeletal:         General: Tenderness present.      Cervical back: Normal range of motion.      Comments: Left knee:  Large joint effusion, mild diffuse tenderness at the joint line.  Decreased range of motion sick slightly warm to touch, no overlying erythema, no wounds, no instability     Neurological: He is alert and oriented to person, place, and time. He has normal strength.   Skin: Skin is warm and dry. Capillary refill takes less than 2 seconds. No rash noted. No pallor.   Psychiatric: He has a normal mood and affect.       ED Course   Arthrocentesis    Date/Time: 2022 2:44 AM  Location procedure was performed: [facility] EMERGENCY DEPARTMENT  Performed by: Venancio Matthews MD  Authorized by: Venancio Matthews MD   Assisting provider: Gio Figueroa MD  Pre-op diagnosis: left knee effusion  Post-op diagnosis: left knee hemarthrosis  Consent Done: Yes  Consent: Written consent obtained.  Consent given by: patient  Patient understanding: patient states understanding of the procedure being performed  Patient consent: the patient's understanding of the procedure matches consent given  Procedure consent: procedure consent matches procedure scheduled  Imaging studies: imaging studies available  Patient identity confirmed:  and MRN  Time out: Immediately prior to procedure a "time out" was called to verify the correct patient, procedure, equipment, support staff and site/side marked as required.  Indications: joint swelling, pain, possible septic joint and diagnostic evaluation   Body area: knee  Joint: left knee  Local anesthesia used: yes  Anesthesia: local infiltration    Anesthesia:  Local anesthesia used: yes  Local Anesthetic: lidocaine 1% without epinephrine  Anesthetic total: 2 mL    Patient sedated: no  Needle size: 18 G  Approach: superior  Aspirate amount: 70 mL  Aspirate: " bloody  Triamcinolone amount: 40 mg  Lidocaine 1% amount: 4 mL  Patient tolerance: Patient tolerated the procedure well with no immediate complications  Complications: No  Specimens: No  Implants: No      Labs Reviewed   GRAM STAIN   CULTURE, BODY FLUID - BACTEC   WBC & DIFF, BODY FLUID   BODY FLUID CRYSTAL   TROPONIN I          Imaging Results              X-Ray Knee 3 View Left (Final result)  Result time 09/27/22 02:01:24      Final result by Mamadou Haas DO (09/27/22 02:01:24)                   Impression:      No acute osseous abnormality of the left knee.    Large joint effusion.      Electronically signed by: Mamadou Haas  Date:    09/27/2022  Time:    02:01               Narrative:    EXAMINATION:  XR KNEE 3 VIEW LEFT    CLINICAL HISTORY:  Pain in left knee    TECHNIQUE:  AP, lateral, and Merchant views of the left knee were performed.    COMPARISON:  None    FINDINGS:  There is diffuse osteopenia.  There is no evidence of an acute fracture or dislocation of the left knee.  There is a remote fracture versus osteophyte at the superior pole of the patella.  Alignment is normal.  There is tricompartmental osteoarthritis.  There is a large joint effusion.  There are vascular calcifications.                                       Medications   LIDOcaine (PF) 10 mg/ml (1%) injection 20 mg (20 mg Injection Given 9/27/22 0149)   triamcinolone acetonide injection 40 mg (40 mg Intra-articular Given 9/27/22 0149)   HYDROcodone-acetaminophen 5-325 mg per tablet 1 tablet (1 tablet Oral Given 9/27/22 0154)   lisinopriL tablet 40 mg (40 mg Oral Given 9/27/22 0154)   amLODIPine tablet 10 mg (10 mg Oral Given 9/27/22 0154)     Medical Decision Making:   Initial Assessment:   This is an emergent evaluation of a 52 y.o.male patient with presentation of 2 issues.  First is left knee pain since today, associated with swelling and pain. Joint has ROM though slightly limited to 90 degrees of flexion, not hot to touch. Second  issue is elevated BP but without symptoms, however severely elevated. Pt ran out of HTN meds    Initial differentials include but are not limited to:  Osteoarthritis, gout, less likely septic joint.  Hypertensive urgency versus emergency.     Plan:  Pain control, troponin, EKG, cardiac monitoring, left knee x-ray, arthrocentesis    Clinical Tests:   Lab Tests: Ordered and Reviewed  Radiological Study: Ordered and Reviewed           ED Course as of 09/27/22 0250   Tue Sep 27, 2022   0211 Left knee x-ray:  The large joint effusion.  There is no acute fracture or subluxation.  Appearance of an osteophyte or small old avulsion fracture at the superior pole of the patella, as independently interpreted by me, Dr. Venancio Matthews. [NP]      ED Course User Index  [NP] Venancio Matthews MD            Arthrocentesis performed as above.  Sample sent to the lab for analysis.  Patient given blood pressure medicine in the ED.  Patient turned over to Dr. Pal pending lab studies and re-evaluation of blood pressure, as well as disposition.  Patient is in stable condition at this time         Clinical Impression:   Final diagnoses:  [M25.562] Left knee pain  [I10] Hypertension  [M25.062] Hemarthrosis of left knee  [M25.462] Effusion of left knee (Primary)               Venancio Matthews MD  09/27/22 0251

## 2022-09-27 NOTE — ED NOTES
Pt BIB [facility] c/o L knee pain x one day. Pt endorses ETOH consumption Saturday night and moving furniture. Possible injury during these activities. Pt also endorses being out of HTN medications x 3 days. Pt denies any other complaints. Pt A/O x 4 w/ ABCs intact, NAD. VSS w/ HTN. L knee swollen, +CSM distally.     Review of patient's allergies indicates:  No Known Allergies     Patient has verified the spelling of their name and  on armband.   APPEARANCE: Patient is alert, calm, oriented x 4, and does not appear distressed.  SKIN: Skin is normal for race, warm, and dry. Normal skin turgor and mucous membranes moist.  CARDIAC: Normal rate and rhythm, no murmur heard.   RESPIRATORY:Normal rate and effort. Breath sounds clear bilaterally throughout chest. Respirations are equal and unlabored.    GASTRO: Bowel sounds normal, abdomen is soft, no tenderness, and no abdominal distention.  MUSCLE: Decreased ROM to LLE, Full ROM to remaining extremities. No bony tenderness or soft tissue tenderness. No obvious deformity.  PERIPHERAL VASCULAR: peripheral pulses present. Normal cap refill. No edema. Warm to touch.  NEURO: 5/5 strength major flexors/extensors bilaterally. Sensory intact to light touch bilaterally. [place] [facility] coma scale: eyes open spontaneously-4, oriented & converses-5, obeys commands-6. No neurological abnormalities.   MENTAL STATUS: awake, alert and aware of environment.  : Voids without complication      Ed orders in progress. Pt SR up x 2. Bed in lowest position with wheels locked. Call bell within reach of pt.

## 2022-09-27 NOTE — DISCHARGE INSTRUCTIONS
Thank you for coming in to see us at [facility]! It was nice to meet you, and I hope you feel better soon. Please feel free to return to the ER at any time should your symptoms get worse, or if you have different emergent concerns.    Our goal in the emergency department is to always give you outstanding care and exceptional service. You may receive a survey by mail or e-mail in the next week regarding your experience in our ED. We would greatly appreciate your completing and returning the survey. Your feedback provides us with a way to recognize our staff who give very good care and it helps us learn how to improve when your experience was below our aspiration of excellence.       Sincerely,    Venancio Matthews MD  Medical Director  Emergency Department  [facility]

## 2022-09-29 ENCOUNTER — TELEPHONE (OUTPATIENT)
Dept: ADMINISTRATIVE | Facility: OTHER | Age: 52
End: 2022-09-29
Payer: MEDICAID

## 2022-09-30 ENCOUNTER — TELEPHONE (OUTPATIENT)
Dept: ADMINISTRATIVE | Facility: OTHER | Age: 52
End: 2022-09-30
Payer: MEDICAID

## 2022-10-03 LAB — BACTERIA FLD CULT: NORMAL
